# Patient Record
Sex: MALE | Race: AMERICAN INDIAN OR ALASKA NATIVE | NOT HISPANIC OR LATINO | Employment: OTHER | URBAN - NONMETROPOLITAN AREA
[De-identification: names, ages, dates, MRNs, and addresses within clinical notes are randomized per-mention and may not be internally consistent; named-entity substitution may affect disease eponyms.]

---

## 2021-08-06 ENCOUNTER — HOSPITAL ENCOUNTER (EMERGENCY)
Facility: HOSPITAL | Age: 51
Discharge: HOME | End: 2021-08-06
Payer: MEDICARE

## 2021-08-06 VITALS
SYSTOLIC BLOOD PRESSURE: 124 MMHG | BODY MASS INDEX: 32.58 KG/M2 | OXYGEN SATURATION: 96 % | WEIGHT: 220 LBS | RESPIRATION RATE: 20 BRPM | DIASTOLIC BLOOD PRESSURE: 54 MMHG | HEIGHT: 69 IN | TEMPERATURE: 101 F | HEART RATE: 71 BPM

## 2021-08-06 DIAGNOSIS — R50.9 FEVER, UNSPECIFIED FEVER CAUSE: ICD-10-CM

## 2021-08-06 DIAGNOSIS — B34.9 VIRAL SYNDROME: Primary | ICD-10-CM

## 2021-08-06 PROCEDURE — 99282 EMERGENCY DEPT VISIT SF MDM: CPT

## 2021-08-06 PROCEDURE — 99282 EMERGENCY DEPT VISIT SF MDM: CPT | Mod: GF | Performed by: NURSE PRACTITIONER

## 2021-08-06 PROCEDURE — 25000003 PHARM REV CODE 250: Performed by: NURSE PRACTITIONER

## 2021-08-06 RX ORDER — ACETAMINOPHEN 325 MG/1
650 TABLET ORAL
Status: COMPLETED | OUTPATIENT
Start: 2021-08-06 | End: 2021-08-06

## 2021-08-06 RX ORDER — ACETAMINOPHEN 500 MG
500 TABLET ORAL
Status: DISCONTINUED | OUTPATIENT
Start: 2021-08-06 | End: 2021-08-06

## 2021-08-06 RX ADMIN — ACETAMINOPHEN 650 MG: 325 TABLET, FILM COATED ORAL at 06:08

## 2023-02-27 PROCEDURE — 99223 1ST HOSP IP/OBS HIGH 75: CPT | Mod: ,,, | Performed by: SURGERY

## 2023-02-27 PROCEDURE — 99223 PR INITIAL HOSPITAL CARE,LEVL III: ICD-10-PCS | Mod: ,,, | Performed by: SURGERY

## 2023-03-01 ENCOUNTER — OUTSIDE PLACE OF SERVICE (OUTPATIENT)
Dept: ADMINISTRATIVE | Facility: HOSPITAL | Age: 53
End: 2023-03-01
Payer: MEDICARE

## 2024-01-01 ENCOUNTER — ANESTHESIA (OUTPATIENT)
Dept: LAB | Facility: HOSPITAL | Age: 54
End: 2024-01-01

## 2024-01-01 ENCOUNTER — ANESTHESIA EVENT (OUTPATIENT)
Dept: INTENSIVE CARE | Facility: HOSPITAL | Age: 54
End: 2024-01-01

## 2024-01-01 ENCOUNTER — HOSPITAL ENCOUNTER (INPATIENT)
Facility: HOSPITAL | Age: 54
LOS: 2 days | DRG: 871 | End: 2024-11-18
Attending: EMERGENCY MEDICINE | Admitting: INTERNAL MEDICINE
Payer: COMMERCIAL

## 2024-01-01 ENCOUNTER — ANESTHESIA (OUTPATIENT)
Dept: INTENSIVE CARE | Facility: HOSPITAL | Age: 54
End: 2024-01-01

## 2024-01-01 ENCOUNTER — ANESTHESIA EVENT (OUTPATIENT)
Dept: LAB | Facility: HOSPITAL | Age: 54
End: 2024-01-01

## 2024-01-01 VITALS
DIASTOLIC BLOOD PRESSURE: 46 MMHG | BODY MASS INDEX: 28.63 KG/M2 | HEIGHT: 69 IN | SYSTOLIC BLOOD PRESSURE: 82 MMHG | WEIGHT: 193.31 LBS | RESPIRATION RATE: 68 BRPM | HEART RATE: 69 BPM | OXYGEN SATURATION: 93 % | TEMPERATURE: 100 F

## 2024-01-01 DIAGNOSIS — I21.A1 TYPE 2 MI (MYOCARDIAL INFARCTION): ICD-10-CM

## 2024-01-01 DIAGNOSIS — A41.9 SEVERE SEPSIS: Primary | ICD-10-CM

## 2024-01-01 DIAGNOSIS — I95.9 HYPOTENSION, UNSPECIFIED HYPOTENSION TYPE: ICD-10-CM

## 2024-01-01 DIAGNOSIS — R07.9 CHEST PAIN: ICD-10-CM

## 2024-01-01 DIAGNOSIS — R07.2 PRECORDIAL PAIN: ICD-10-CM

## 2024-01-01 DIAGNOSIS — I95.2 HYPOTENSION DUE TO DRUGS: ICD-10-CM

## 2024-01-01 DIAGNOSIS — J96.01 ACUTE RESPIRATORY FAILURE WITH HYPOXIA: ICD-10-CM

## 2024-01-01 DIAGNOSIS — E87.1 HYPONATREMIA: ICD-10-CM

## 2024-01-01 DIAGNOSIS — R79.89 ELEVATED TROPONIN: ICD-10-CM

## 2024-01-01 DIAGNOSIS — N18.9 CHRONIC RENAL FAILURE, UNSPECIFIED CKD STAGE: ICD-10-CM

## 2024-01-01 DIAGNOSIS — J90 PLEURAL EFFUSION ON RIGHT: ICD-10-CM

## 2024-01-01 DIAGNOSIS — N18.6 ESRD (END STAGE RENAL DISEASE): ICD-10-CM

## 2024-01-01 DIAGNOSIS — G93.41 ACUTE METABOLIC ENCEPHALOPATHY: ICD-10-CM

## 2024-01-01 DIAGNOSIS — N17.9 ARF (ACUTE RENAL FAILURE): ICD-10-CM

## 2024-01-01 DIAGNOSIS — R65.10 SIRS (SYSTEMIC INFLAMMATORY RESPONSE SYNDROME): ICD-10-CM

## 2024-01-01 DIAGNOSIS — E87.70 VOLUME OVERLOAD: ICD-10-CM

## 2024-01-01 DIAGNOSIS — R78.81 BACTEREMIA: ICD-10-CM

## 2024-01-01 DIAGNOSIS — E87.70 HYPERVOLEMIA, UNSPECIFIED HYPERVOLEMIA TYPE: ICD-10-CM

## 2024-01-01 DIAGNOSIS — R65.20 SEVERE SEPSIS: Primary | ICD-10-CM

## 2024-01-01 LAB
ALBUMIN FLD-MCNC: 1.8 G/DL
ALBUMIN SERPL BCP-MCNC: 2.4 G/DL (ref 3.5–5)
ALBUMIN SERPL BCP-MCNC: 2.4 G/DL (ref 3.5–5)
ALBUMIN/GLOB SERPL: 0.6 {RATIO}
ALP SERPL-CCNC: 120 U/L (ref 40–150)
ALP SERPL-CCNC: 139 U/L (ref 40–150)
ALT SERPL W P-5'-P-CCNC: 14 U/L (ref 0–55)
ALT SERPL W P-5'-P-CCNC: 22 U/L (ref 0–55)
AMMONIA PLAS-SCNC: 29 ΜMOL/L (ref 18–72)
ANION GAP SERPL CALCULATED.3IONS-SCNC: 15 MMOL/L (ref 7–16)
ANION GAP SERPL CALCULATED.3IONS-SCNC: 18 MMOL/L (ref 7–16)
ANION GAP SERPL CALCULATED.3IONS-SCNC: 19 MMOL/L (ref 7–16)
ANION GAP SERPL CALCULATED.3IONS-SCNC: 20 MMOL/L (ref 7–16)
ANION GAP SERPL CALCULATED.3IONS-SCNC: 21 MMOL/L (ref 7–16)
ANISOCYTOSIS BLD QL SMEAR: ABNORMAL
ANISOCYTOSIS BLD QL SMEAR: ABNORMAL
AORTIC ROOT ANNULUS: 3.11 CM
AORTIC VALVE CUSP SEPERATION: 1.98 CM
AST SERPL W P-5'-P-CCNC: 39 U/L (ref 5–34)
AST SERPL W P-5'-P-CCNC: 41 U/L (ref 5–34)
AV INDEX (PROSTH): 0.4
AV MEAN GRADIENT: 11.4 MMHG
AV PEAK GRADIENT: 19.4 MMHG
AV VALVE AREA BY VELOCITY RATIO: 1.8 CM²
AV VALVE AREA: 1.8 CM²
AV VELOCITY RATIO: 0.41
BASOPHILS # BLD AUTO: 0.03 K/UL (ref 0–0.2)
BASOPHILS # BLD AUTO: 0.06 K/UL (ref 0–0.2)
BASOPHILS # BLD AUTO: 0.07 K/UL (ref 0–0.2)
BASOPHILS # BLD AUTO: 0.1 K/UL (ref 0–0.2)
BASOPHILS # BLD AUTO: 0.12 K/UL (ref 0–0.2)
BASOPHILS NFR BLD AUTO: 0.2 % (ref 0–1)
BASOPHILS NFR BLD AUTO: 0.4 % (ref 0–1)
BASOPHILS NFR BLD AUTO: 0.5 % (ref 0–1)
BASOPHILS NFR BLD AUTO: 0.5 % (ref 0–1)
BASOPHILS NFR BLD AUTO: 0.7 % (ref 0–1)
BILIRUB DIRECT SERPL-MCNC: 1 MG/DL (ref 0–?)
BILIRUB SERPL-MCNC: 1.2 MG/DL
BILIRUB SERPL-MCNC: 1.7 MG/DL
BSA FOR ECHO PROCEDURE: 2.02 M2
BUN SERPL-MCNC: 49 MG/DL (ref 8–26)
BUN SERPL-MCNC: 63 MG/DL (ref 8–26)
BUN SERPL-MCNC: 63 MG/DL (ref 8–26)
BUN SERPL-MCNC: 69 MG/DL (ref 8–26)
BUN SERPL-MCNC: 77 MG/DL (ref 8–26)
BUN/CREAT SERPL: 10 (ref 6–20)
BUN/CREAT SERPL: 10 (ref 6–20)
BUN/CREAT SERPL: 11 (ref 6–20)
BUN/CREAT SERPL: 11 (ref 6–20)
BUN/CREAT SERPL: 9 (ref 6–20)
CALCIUM SERPL-MCNC: 10.4 MG/DL (ref 8.4–10.2)
CALCIUM SERPL-MCNC: 7.8 MG/DL (ref 8.4–10.2)
CALCIUM SERPL-MCNC: 7.9 MG/DL (ref 8.4–10.2)
CALCIUM SERPL-MCNC: 8 MG/DL (ref 8.4–10.2)
CALCIUM SERPL-MCNC: 8 MG/DL (ref 8.4–10.2)
CHLORIDE SERPL-SCNC: 87 MMOL/L (ref 98–107)
CHLORIDE SERPL-SCNC: 87 MMOL/L (ref 98–107)
CHLORIDE SERPL-SCNC: 89 MMOL/L (ref 98–107)
CHLORIDE SERPL-SCNC: 90 MMOL/L (ref 98–107)
CHLORIDE SERPL-SCNC: 90 MMOL/L (ref 98–107)
CHOLEST FLD-MCNC: 36 MG/DL
CLARITY FLD: ABNORMAL
CO2 SERPL-SCNC: 20 MMOL/L (ref 22–29)
CO2 SERPL-SCNC: 22 MMOL/L (ref 22–29)
CO2 SERPL-SCNC: 22 MMOL/L (ref 22–29)
CO2 SERPL-SCNC: 23 MMOL/L (ref 22–29)
CO2 SERPL-SCNC: 25 MMOL/L (ref 22–29)
COLOR FLD: ABNORMAL
CORTIS SERPL-MCNC: 14.5 ΜG/DL
CREAT SERPL-MCNC: 5.09 MG/DL (ref 0.72–1.25)
CREAT SERPL-MCNC: 5.84 MG/DL (ref 0.72–1.25)
CREAT SERPL-MCNC: 6.28 MG/DL (ref 0.72–1.25)
CREAT SERPL-MCNC: 6.85 MG/DL (ref 0.72–1.25)
CREAT SERPL-MCNC: 7.38 MG/DL (ref 0.72–1.25)
CV ECHO LV RWT: 0.59 CM
DIFFERENTIAL METHOD BLD: ABNORMAL
DOP CALC AO PEAK VEL: 2.2 M/S
DOP CALC AO VTI: 42.5 CM
DOP CALC LVOT AREA: 4.5 CM2
DOP CALC LVOT DIAMETER: 2.4 CM
DOP CALC LVOT PEAK VEL: 0.9 M/S
DOP CALC LVOT STROKE VOLUME: 76.4 CM3
DOP CALCLVOT PEAK VEL VTI: 16.9 CM
ECHO LV POSTERIOR WALL: 1.1 CM (ref 0.6–1.1)
EGFR (NO RACE VARIABLE) (RUSH/TITUS): 10 ML/MIN/1.73M2
EGFR (NO RACE VARIABLE) (RUSH/TITUS): 11 ML/MIN/1.73M2
EGFR (NO RACE VARIABLE) (RUSH/TITUS): 13 ML/MIN/1.73M2
EGFR (NO RACE VARIABLE) (RUSH/TITUS): 8 ML/MIN/1.73M2
EGFR (NO RACE VARIABLE) (RUSH/TITUS): 9 ML/MIN/1.73M2
EOSINOPHIL # BLD AUTO: 0.01 K/UL (ref 0–0.5)
EOSINOPHIL # BLD AUTO: 0.01 K/UL (ref 0–0.5)
EOSINOPHIL # BLD AUTO: 0.05 K/UL (ref 0–0.5)
EOSINOPHIL # BLD AUTO: 0.07 K/UL (ref 0–0.5)
EOSINOPHIL # BLD AUTO: 0.1 K/UL (ref 0–0.5)
EOSINOPHIL NFR BLD AUTO: 0.1 % (ref 1–4)
EOSINOPHIL NFR BLD AUTO: 0.1 % (ref 1–4)
EOSINOPHIL NFR BLD AUTO: 0.3 % (ref 1–4)
EOSINOPHIL NFR BLD AUTO: 0.4 % (ref 1–4)
EOSINOPHIL NFR BLD AUTO: 0.8 % (ref 1–4)
ERYTHROCYTE [DISTWIDTH] IN BLOOD BY AUTOMATED COUNT: 14.5 % (ref 11.5–14.5)
ERYTHROCYTE [DISTWIDTH] IN BLOOD BY AUTOMATED COUNT: 14.6 % (ref 11.5–14.5)
ERYTHROCYTE [DISTWIDTH] IN BLOOD BY AUTOMATED COUNT: 14.6 % (ref 11.5–14.5)
ERYTHROCYTE [DISTWIDTH] IN BLOOD BY AUTOMATED COUNT: 14.7 % (ref 11.5–14.5)
ERYTHROCYTE [DISTWIDTH] IN BLOOD BY AUTOMATED COUNT: 14.8 % (ref 11.5–14.5)
EST. AVERAGE GLUCOSE BLD GHB EST-MCNC: 126 MG/DL
FOLATE SERPL-MCNC: 5.8 NG/ML (ref 7–31.4)
FRACTIONAL SHORTENING: 16.2 % (ref 28–44)
GLOBULIN SER-MCNC: 4 G/DL (ref 2–4)
GLUCOSE FLD-MCNC: 121 MG/DL
GLUCOSE SERPL-MCNC: 170 MG/DL (ref 74–100)
GLUCOSE SERPL-MCNC: 194 MG/DL (ref 70–105)
GLUCOSE SERPL-MCNC: 197 MG/DL (ref 70–105)
GLUCOSE SERPL-MCNC: 219 MG/DL (ref 74–100)
GLUCOSE SERPL-MCNC: 229 MG/DL (ref 74–100)
GLUCOSE SERPL-MCNC: 238 MG/DL (ref 70–105)
GLUCOSE SERPL-MCNC: 243 MG/DL (ref 70–105)
GLUCOSE SERPL-MCNC: 250 MG/DL (ref 70–105)
GLUCOSE SERPL-MCNC: 257 MG/DL (ref 74–100)
GLUCOSE SERPL-MCNC: 261 MG/DL (ref 74–100)
GLUCOSE SERPL-MCNC: 266 MG/DL (ref 70–105)
GLUCOSE SERPL-MCNC: 277 MG/DL (ref 70–105)
GLUCOSE SERPL-MCNC: 281 MG/DL (ref 70–105)
GRANULOCYTES NFR FLD MANUAL: 73 % (ref 0–25)
HBA1C MFR BLD HPLC: 6 %
HCO3 UR-SCNC: 25.5 MMOL/L (ref 21–28)
HCO3 UR-SCNC: 27.2 MMOL/L (ref 21–28)
HCT VFR BLD AUTO: 26.5 % (ref 40–54)
HCT VFR BLD AUTO: 27.8 % (ref 40–54)
HCT VFR BLD AUTO: 28.1 % (ref 40–54)
HCT VFR BLD AUTO: 28.7 % (ref 40–54)
HCT VFR BLD AUTO: 29.8 % (ref 40–54)
HGB BLD-MCNC: 8.6 G/DL (ref 13.5–18)
HGB BLD-MCNC: 9.2 G/DL (ref 13.5–18)
HGB BLD-MCNC: 9.2 G/DL (ref 13.5–18)
HGB BLD-MCNC: 9.5 G/DL (ref 13.5–18)
HGB BLD-MCNC: 9.8 G/DL (ref 13.5–18)
HYPOCHROMIA BLD QL SMEAR: ABNORMAL
IMM GRANULOCYTES # BLD AUTO: 0.06 K/UL (ref 0–0.04)
IMM GRANULOCYTES # BLD AUTO: 0.21 K/UL (ref 0–0.04)
IMM GRANULOCYTES # BLD AUTO: 0.71 K/UL (ref 0–0.04)
IMM GRANULOCYTES # BLD AUTO: 0.95 K/UL (ref 0–0.04)
IMM GRANULOCYTES # BLD AUTO: 1.88 K/UL (ref 0–0.04)
IMM GRANULOCYTES NFR BLD: 0.5 % (ref 0–0.4)
IMM GRANULOCYTES NFR BLD: 1.6 % (ref 0–0.4)
IMM GRANULOCYTES NFR BLD: 11.9 % (ref 0–0.4)
IMM GRANULOCYTES NFR BLD: 4.4 % (ref 0–0.4)
IMM GRANULOCYTES NFR BLD: 5.1 % (ref 0–0.4)
INTERVENTRICULAR SEPTUM: 1.4 CM (ref 0.6–1.1)
IVC DIAMETER: 1.99 CM
LACTATE SERPL-SCNC: 0.7 MMOL/L (ref 0.5–2.2)
LACTATE SERPL-SCNC: 1.1 MMOL/L (ref 0.5–2.2)
LACTATE SERPL-SCNC: 1.6 MMOL/L (ref 0.5–2.2)
LDH FLD-CCNC: 667 U/L
LEFT ATRIUM SIZE: 4.4 CM
LEFT INTERNAL DIMENSION IN SYSTOLE: 3.1 CM (ref 2.1–4)
LEFT VENTRICLE DIASTOLIC VOLUME INDEX: 28.84 ML/M2
LEFT VENTRICLE DIASTOLIC VOLUME: 58.83 ML
LEFT VENTRICLE MASS INDEX: 76.8 G/M2
LEFT VENTRICLE SYSTOLIC VOLUME INDEX: 19 ML/M2
LEFT VENTRICLE SYSTOLIC VOLUME: 38.72 ML
LEFT VENTRICULAR INTERNAL DIMENSION IN DIASTOLE: 3.7 CM (ref 3.5–6)
LEFT VENTRICULAR MASS: 156.7 G
LVED V (TEICH): 58.83 ML
LVES V (TEICH): 38.72 ML
LVOT MG: 1.56 MMHG
LVOT MV: 0.59 CM/S
LYMPHOCYTES # BLD AUTO: 0.28 K/UL (ref 1–4.8)
LYMPHOCYTES # BLD AUTO: 0.29 K/UL (ref 1–4.8)
LYMPHOCYTES # BLD AUTO: 0.54 K/UL (ref 1–4.8)
LYMPHOCYTES # BLD AUTO: 0.72 K/UL (ref 1–4.8)
LYMPHOCYTES # BLD AUTO: 2.12 K/UL (ref 1–4.8)
LYMPHOCYTES NFR BLD AUTO: 1.8 % (ref 27–41)
LYMPHOCYTES NFR BLD AUTO: 11.3 % (ref 27–41)
LYMPHOCYTES NFR BLD AUTO: 2.2 % (ref 27–41)
LYMPHOCYTES NFR BLD AUTO: 4.3 % (ref 27–41)
LYMPHOCYTES NFR BLD AUTO: 4.4 % (ref 27–41)
LYMPHOCYTES NFR BLD MANUAL: 2 % (ref 27–41)
LYMPHOCYTES NFR BLD MANUAL: 5 % (ref 27–41)
LYMPHOCYTES NFR BLD MANUAL: 8 % (ref 27–41)
LYMPHOCYTES NFR FLD MANUAL: 9 %
MAGNESIUM SERPL-MCNC: 2.2 MG/DL (ref 1.6–2.6)
MAGNESIUM SERPL-MCNC: 2.2 MG/DL (ref 1.6–2.6)
MAGNESIUM SERPL-MCNC: 2.3 MG/DL (ref 1.6–2.6)
MCH RBC QN AUTO: 32.3 PG (ref 27–31)
MCH RBC QN AUTO: 32.6 PG (ref 27–31)
MCH RBC QN AUTO: 32.9 PG (ref 27–31)
MCH RBC QN AUTO: 33.1 PG (ref 27–31)
MCH RBC QN AUTO: 33.2 PG (ref 27–31)
MCHC RBC AUTO-ENTMCNC: 32.5 G/DL (ref 32–36)
MCHC RBC AUTO-ENTMCNC: 32.7 G/DL (ref 32–36)
MCHC RBC AUTO-ENTMCNC: 32.9 G/DL (ref 32–36)
MCHC RBC AUTO-ENTMCNC: 33.1 G/DL (ref 32–36)
MCHC RBC AUTO-ENTMCNC: 33.1 G/DL (ref 32–36)
MCV RBC AUTO: 101.1 FL (ref 80–96)
MCV RBC AUTO: 102.3 FL (ref 80–96)
MCV RBC AUTO: 98.3 FL (ref 80–96)
MCV RBC AUTO: 98.6 FL (ref 80–96)
MCV RBC AUTO: 99.3 FL (ref 80–96)
METAMYELOCYTES NFR BLD MANUAL: 2 %
MONOCYTES # BLD AUTO: 0.48 K/UL (ref 0–0.8)
MONOCYTES # BLD AUTO: 0.85 K/UL (ref 0–0.8)
MONOCYTES # BLD AUTO: 1.13 K/UL (ref 0–0.8)
MONOCYTES # BLD AUTO: 1.42 K/UL (ref 0–0.8)
MONOCYTES # BLD AUTO: 1.43 K/UL (ref 0–0.8)
MONOCYTES NFR BLD AUTO: 3.7 % (ref 2–6)
MONOCYTES NFR BLD AUTO: 5.4 % (ref 2–6)
MONOCYTES NFR BLD AUTO: 7.6 % (ref 2–6)
MONOCYTES NFR BLD AUTO: 8.7 % (ref 2–6)
MONOCYTES NFR BLD AUTO: 9 % (ref 2–6)
MONOCYTES NFR BLD MANUAL: 1 % (ref 2–6)
MONOCYTES NFR BLD MANUAL: 2 % (ref 2–6)
MONOCYTES NFR BLD MANUAL: 5 % (ref 2–6)
MONOCYTES NFR FLD MANUAL: 18 %
MPC BLD CALC-MCNC: 11 FL (ref 9.4–12.4)
MPC BLD CALC-MCNC: 11.1 FL (ref 9.4–12.4)
MPC BLD CALC-MCNC: 11.9 FL (ref 9.4–12.4)
MPC BLD CALC-MCNC: 12 FL (ref 9.4–12.4)
MPC BLD CALC-MCNC: 13.2 FL (ref 9.4–12.4)
NEUTROPHILS # BLD AUTO: 10.79 K/UL (ref 1.8–7.7)
NEUTROPHILS # BLD AUTO: 11.78 K/UL (ref 1.8–7.7)
NEUTROPHILS # BLD AUTO: 12.62 K/UL (ref 1.8–7.7)
NEUTROPHILS # BLD AUTO: 13.29 K/UL (ref 1.8–7.7)
NEUTROPHILS # BLD AUTO: 14.12 K/UL (ref 1.8–7.7)
NEUTROPHILS NFR BLD AUTO: 75.2 % (ref 53–65)
NEUTROPHILS NFR BLD AUTO: 80.1 % (ref 53–65)
NEUTROPHILS NFR BLD AUTO: 81.7 % (ref 53–65)
NEUTROPHILS NFR BLD AUTO: 85.9 % (ref 53–65)
NEUTROPHILS NFR BLD AUTO: 91.2 % (ref 53–65)
NEUTS BAND NFR BLD MANUAL: 20 % (ref 1–5)
NEUTS BAND NFR BLD MANUAL: 4 % (ref 1–5)
NEUTS BAND NFR BLD MANUAL: 8 % (ref 1–5)
NEUTS SEG NFR BLD MANUAL: 68 % (ref 50–62)
NEUTS SEG NFR BLD MANUAL: 86 % (ref 50–62)
NEUTS SEG NFR BLD MANUAL: 89 % (ref 50–62)
NRBC # BLD AUTO: 0 X10E3/UL
NRBC # BLD AUTO: 0.02 X10E3/UL
NRBC, AUTO (.00): 0 %
NRBC, AUTO (.00): 0.1 %
NT-PROBNP SERPL-MCNC: ABNORMAL PG/ML (ref 1–125)
NT-PROBNP SERPL-MCNC: ABNORMAL PG/ML (ref 1–125)
OHS CV RV/LV RATIO: 1.27 CM
OVALOCYTES BLD QL SMEAR: ABNORMAL
PCO2 BLDA: 35 MMHG (ref 35–48)
PCO2 BLDA: 40 MMHG (ref 35–48)
PH SMN: 7.44 [PH] (ref 7.35–7.45)
PH SMN: 7.47 [PH] (ref 7.35–7.45)
PISA TR MAX VEL: 3.49 M/S
PLATELET # BLD AUTO: 114 K/UL (ref 150–400)
PLATELET # BLD AUTO: 120 K/UL (ref 150–400)
PLATELET # BLD AUTO: 123 K/UL (ref 150–400)
PLATELET # BLD AUTO: 66 K/UL (ref 150–400)
PLATELET # BLD AUTO: 75 K/UL (ref 150–400)
PLATELET MORPHOLOGY: ABNORMAL
PLATELET MORPHOLOGY: ABNORMAL
PLATELET MORPHOLOGY: NORMAL
PO2 BLDA: 32 MMHG (ref 83–108)
PO2 BLDA: 62 MMHG (ref 83–108)
POC BASE EXCESS: 2 MMOL/L (ref -2–3)
POC BASE EXCESS: 2.8 MMOL/L (ref -2–3)
POC OCCULT BLOOD STOOL: NEGATIVE
POC SATURATED O2: 65 % (ref 95–98)
POC SATURATED O2: 93 % (ref 95–98)
POLYCHROMASIA BLD QL SMEAR: ABNORMAL
POTASSIUM SERPL-SCNC: 4.4 MMOL/L (ref 3.5–5.1)
POTASSIUM SERPL-SCNC: 4.4 MMOL/L (ref 3.5–5.1)
POTASSIUM SERPL-SCNC: 4.5 MMOL/L (ref 3.5–5.1)
POTASSIUM SERPL-SCNC: 4.5 MMOL/L (ref 3.5–5.1)
POTASSIUM SERPL-SCNC: 4.6 MMOL/L (ref 3.5–5.1)
PROT FLD-MCNC: 3.9 G/DL
PROT SERPL-MCNC: 6.2 G/DL (ref 6.4–8.3)
PROT SERPL-MCNC: 6.4 G/DL (ref 6.4–8.3)
RA VOL SYS: 130.41 ML
RBC # BLD AUTO: 2.59 M/UL (ref 4.6–6.2)
RBC # BLD AUTO: 2.78 M/UL (ref 4.6–6.2)
RBC # BLD AUTO: 2.82 M/UL (ref 4.6–6.2)
RBC # BLD AUTO: 2.89 M/UL (ref 4.6–6.2)
RBC # BLD AUTO: 3.03 M/UL (ref 4.6–6.2)
RBC # FLD MANUAL: ABNORMAL /CUMM
RIGHT ATRIAL AREA: 32.5 CM2
RIGHT ATRIUM VOLUME AREA LENGTH APICAL 4 CHAMBER: 125.1 ML
RIGHT VENTRICLE DIASTOLIC BASEL DIMENSION: 4.7 CM
RIGHT VENTRICLE DIASTOLIC LENGTH: 10.4 CM
RIGHT VENTRICULAR END-DIASTOLIC DIMENSION: 4.87 CM
RIGHT VENTRICULAR LENGTH IN DIASTOLE (APICAL 4-CHAMBER VIEW): 10.43 CM
SODIUM SERPL-SCNC: 122 MMOL/L (ref 136–145)
SODIUM SERPL-SCNC: 123 MMOL/L (ref 136–145)
SODIUM SERPL-SCNC: 125 MMOL/L (ref 136–145)
SODIUM SERPL-SCNC: 126 MMOL/L (ref 136–145)
SODIUM SERPL-SCNC: 129 MMOL/L (ref 136–145)
TR MAX PG: 49 MMHG
TRIGL SERPL-MCNC: 23 MG/DL
TROPONIN I SERPL HS-MCNC: 133.9 NG/L
TROPONIN I SERPL HS-MCNC: 142.5 NG/L
TROPONIN I SERPL HS-MCNC: 158.8 NG/L
TROPONIN I SERPL HS-MCNC: 67.4 NG/L
TROPONIN I SERPL HS-MCNC: 80.2 NG/L
TROPONIN I SERPL HS-MCNC: 86.8 NG/L
TSH SERPL DL<=0.005 MIU/L-ACNC: 3.73 UIU/ML (ref 0.35–4.94)
VERIGENE RESULT: ABNORMAL
VIT B12 SERPL-MCNC: >2000 PG/ML (ref 213–816)
WBC # BLD AUTO: 12.56 K/UL (ref 4.5–11)
WBC # BLD AUTO: 12.91 K/UL (ref 4.5–11)
WBC # BLD AUTO: 15.78 K/UL (ref 4.5–11)
WBC # BLD AUTO: 16.27 K/UL (ref 4.5–11)
WBC # BLD AUTO: 18.77 K/UL (ref 4.5–11)
WBC # FLD MANUAL: ABNORMAL /CUMM
Z-SCORE OF LEFT VENTRICULAR DIMENSION IN END DIASTOLE: -4.97
Z-SCORE OF LEFT VENTRICULAR DIMENSION IN END SYSTOLE: -1.46

## 2024-01-01 PROCEDURE — 27000221 HC OXYGEN, UP TO 24 HOURS

## 2024-01-01 PROCEDURE — 94660 CPAP INITIATION&MGMT: CPT

## 2024-01-01 PROCEDURE — 5A1935Z RESPIRATORY VENTILATION, LESS THAN 24 CONSECUTIVE HOURS: ICD-10-PCS | Performed by: INTERNAL MEDICINE

## 2024-01-01 PROCEDURE — 99900026 HC AIRWAY MAINTENANCE (STAT)

## 2024-01-01 PROCEDURE — 93005 ELECTROCARDIOGRAM TRACING: CPT

## 2024-01-01 PROCEDURE — 94640 AIRWAY INHALATION TREATMENT: CPT | Mod: XB

## 2024-01-01 PROCEDURE — 25000003 PHARM REV CODE 250: Performed by: INTERNAL MEDICINE

## 2024-01-01 PROCEDURE — 82272 OCCULT BLD FECES 1-3 TESTS: CPT

## 2024-01-01 PROCEDURE — C1729 CATH, DRAINAGE: HCPCS

## 2024-01-01 PROCEDURE — 99900035 HC TECH TIME PER 15 MIN (STAT)

## 2024-01-01 PROCEDURE — 94640 AIRWAY INHALATION TREATMENT: CPT

## 2024-01-01 PROCEDURE — 99291 CRITICAL CARE FIRST HOUR: CPT | Mod: 25,,, | Performed by: STUDENT IN AN ORGANIZED HEALTH CARE EDUCATION/TRAINING PROGRAM

## 2024-01-01 PROCEDURE — 90935 HEMODIALYSIS ONE EVALUATION: CPT

## 2024-01-01 PROCEDURE — 85025 COMPLETE CBC W/AUTO DIFF WBC: CPT | Performed by: INTERNAL MEDICINE

## 2024-01-01 PROCEDURE — 63600175 PHARM REV CODE 636 W HCPCS: Performed by: INTERNAL MEDICINE

## 2024-01-01 PROCEDURE — 25000003 PHARM REV CODE 250: Performed by: STUDENT IN AN ORGANIZED HEALTH CARE EDUCATION/TRAINING PROGRAM

## 2024-01-01 PROCEDURE — 80048 BASIC METABOLIC PNL TOTAL CA: CPT | Performed by: INTERNAL MEDICINE

## 2024-01-01 PROCEDURE — 82962 GLUCOSE BLOOD TEST: CPT

## 2024-01-01 PROCEDURE — 32551 INSERTION OF CHEST TUBE: CPT

## 2024-01-01 PROCEDURE — 99900017 HC EXTUBATION W/PARAMETERS (STAT)

## 2024-01-01 PROCEDURE — 0BH18EZ INSERTION OF ENDOTRACHEAL AIRWAY INTO TRACHEA, VIA NATURAL OR ARTIFICIAL OPENING ENDOSCOPIC: ICD-10-PCS | Performed by: INTERNAL MEDICINE

## 2024-01-01 PROCEDURE — 36415 COLL VENOUS BLD VENIPUNCTURE: CPT | Performed by: EMERGENCY MEDICINE

## 2024-01-01 PROCEDURE — 5A09357 ASSISTANCE WITH RESPIRATORY VENTILATION, LESS THAN 24 CONSECUTIVE HOURS, CONTINUOUS POSITIVE AIRWAY PRESSURE: ICD-10-PCS | Performed by: INTERNAL MEDICINE

## 2024-01-01 PROCEDURE — 27000190 HC CPAP FULL FACE MASK W/VALVE

## 2024-01-01 PROCEDURE — 84484 ASSAY OF TROPONIN QUANT: CPT | Performed by: INTERNAL MEDICINE

## 2024-01-01 PROCEDURE — G0378 HOSPITAL OBSERVATION PER HR: HCPCS

## 2024-01-01 PROCEDURE — 25000242 PHARM REV CODE 250 ALT 637 W/ HCPCS: Performed by: INTERNAL MEDICINE

## 2024-01-01 PROCEDURE — 84484 ASSAY OF TROPONIN QUANT: CPT | Mod: 91 | Performed by: INTERNAL MEDICINE

## 2024-01-01 PROCEDURE — 36415 COLL VENOUS BLD VENIPUNCTURE: CPT | Performed by: INTERNAL MEDICINE

## 2024-01-01 PROCEDURE — 25000242 PHARM REV CODE 250 ALT 637 W/ HCPCS: Performed by: EMERGENCY MEDICINE

## 2024-01-01 PROCEDURE — 63600175 PHARM REV CODE 636 W HCPCS: Performed by: HOSPITALIST

## 2024-01-01 PROCEDURE — 88305 TISSUE EXAM BY PATHOLOGIST: CPT | Mod: TC,MCY | Performed by: STUDENT IN AN ORGANIZED HEALTH CARE EDUCATION/TRAINING PROGRAM

## 2024-01-01 PROCEDURE — 36600 WITHDRAWAL OF ARTERIAL BLOOD: CPT

## 2024-01-01 PROCEDURE — 63600175 PHARM REV CODE 636 W HCPCS: Performed by: EMERGENCY MEDICINE

## 2024-01-01 PROCEDURE — 83735 ASSAY OF MAGNESIUM: CPT | Performed by: INTERNAL MEDICINE

## 2024-01-01 PROCEDURE — 93010 ELECTROCARDIOGRAM REPORT: CPT | Mod: ,,, | Performed by: INTERNAL MEDICINE

## 2024-01-01 PROCEDURE — 94799 UNLISTED PULMONARY SVC/PX: CPT

## 2024-01-01 PROCEDURE — 82803 BLOOD GASES ANY COMBINATION: CPT

## 2024-01-01 PROCEDURE — 84478 ASSAY OF TRIGLYCERIDES: CPT | Performed by: STUDENT IN AN ORGANIZED HEALTH CARE EDUCATION/TRAINING PROGRAM

## 2024-01-01 PROCEDURE — 83605 ASSAY OF LACTIC ACID: CPT | Performed by: INTERNAL MEDICINE

## 2024-01-01 PROCEDURE — 82140 ASSAY OF AMMONIA: CPT | Performed by: INTERNAL MEDICINE

## 2024-01-01 PROCEDURE — 94761 N-INVAS EAR/PLS OXIMETRY MLT: CPT

## 2024-01-01 PROCEDURE — 80076 HEPATIC FUNCTION PANEL: CPT | Performed by: STUDENT IN AN ORGANIZED HEALTH CARE EDUCATION/TRAINING PROGRAM

## 2024-01-01 PROCEDURE — 88112 CYTOPATH CELL ENHANCE TECH: CPT | Mod: 26,,, | Performed by: PATHOLOGY

## 2024-01-01 PROCEDURE — 31500 INSERT EMERGENCY AIRWAY: CPT

## 2024-01-01 PROCEDURE — 87149 DNA/RNA DIRECT PROBE: CPT | Performed by: INTERNAL MEDICINE

## 2024-01-01 PROCEDURE — 99285 EMERGENCY DEPT VISIT HI MDM: CPT | Mod: 25

## 2024-01-01 PROCEDURE — 84157 ASSAY OF PROTEIN OTHER: CPT | Performed by: STUDENT IN AN ORGANIZED HEALTH CARE EDUCATION/TRAINING PROGRAM

## 2024-01-01 PROCEDURE — 5A1D70Z PERFORMANCE OF URINARY FILTRATION, INTERMITTENT, LESS THAN 6 HOURS PER DAY: ICD-10-PCS | Performed by: INTERNAL MEDICINE

## 2024-01-01 PROCEDURE — 82607 VITAMIN B-12: CPT | Performed by: INTERNAL MEDICINE

## 2024-01-01 PROCEDURE — 87040 BLOOD CULTURE FOR BACTERIA: CPT | Performed by: INTERNAL MEDICINE

## 2024-01-01 PROCEDURE — 83880 ASSAY OF NATRIURETIC PEPTIDE: CPT | Performed by: INTERNAL MEDICINE

## 2024-01-01 PROCEDURE — 84484 ASSAY OF TROPONIN QUANT: CPT | Performed by: STUDENT IN AN ORGANIZED HEALTH CARE EDUCATION/TRAINING PROGRAM

## 2024-01-01 PROCEDURE — 0W9930Z DRAINAGE OF RIGHT PLEURAL CAVITY WITH DRAINAGE DEVICE, PERCUTANEOUS APPROACH: ICD-10-PCS | Performed by: STUDENT IN AN ORGANIZED HEALTH CARE EDUCATION/TRAINING PROGRAM

## 2024-01-01 PROCEDURE — 5A0945A ASSISTANCE WITH RESPIRATORY VENTILATION, 24-96 CONSECUTIVE HOURS, HIGH NASAL FLOW/VELOCITY: ICD-10-PCS | Performed by: INTERNAL MEDICINE

## 2024-01-01 PROCEDURE — 88305 TISSUE EXAM BY PATHOLOGIST: CPT | Mod: 26,,, | Performed by: PATHOLOGY

## 2024-01-01 PROCEDURE — 25500020 PHARM REV CODE 255: Performed by: INTERNAL MEDICINE

## 2024-01-01 PROCEDURE — 63600175 PHARM REV CODE 636 W HCPCS: Performed by: STUDENT IN AN ORGANIZED HEALTH CARE EDUCATION/TRAINING PROGRAM

## 2024-01-01 PROCEDURE — 89050 BODY FLUID CELL COUNT: CPT | Performed by: STUDENT IN AN ORGANIZED HEALTH CARE EDUCATION/TRAINING PROGRAM

## 2024-01-01 PROCEDURE — 96372 THER/PROPH/DIAG INJ SC/IM: CPT | Performed by: INTERNAL MEDICINE

## 2024-01-01 PROCEDURE — 92950 HEART/LUNG RESUSCITATION CPR: CPT | Mod: ,,, | Performed by: INTERNAL MEDICINE

## 2024-01-01 PROCEDURE — 36415 COLL VENOUS BLD VENIPUNCTURE: CPT | Mod: 91 | Performed by: INTERNAL MEDICINE

## 2024-01-01 PROCEDURE — 84484 ASSAY OF TROPONIN QUANT: CPT | Performed by: EMERGENCY MEDICINE

## 2024-01-01 PROCEDURE — 5A12012 PERFORMANCE OF CARDIAC OUTPUT, SINGLE, MANUAL: ICD-10-PCS | Performed by: INTERNAL MEDICINE

## 2024-01-01 PROCEDURE — 83036 HEMOGLOBIN GLYCOSYLATED A1C: CPT | Performed by: HOSPITALIST

## 2024-01-01 PROCEDURE — 99223 1ST HOSP IP/OBS HIGH 75: CPT | Mod: ,,, | Performed by: INTERNAL MEDICINE

## 2024-01-01 PROCEDURE — 82042 OTHER SOURCE ALBUMIN QUAN EA: CPT | Performed by: STUDENT IN AN ORGANIZED HEALTH CARE EDUCATION/TRAINING PROGRAM

## 2024-01-01 PROCEDURE — 99233 SBSQ HOSP IP/OBS HIGH 50: CPT | Mod: 25,,, | Performed by: INTERNAL MEDICINE

## 2024-01-01 PROCEDURE — 96374 THER/PROPH/DIAG INJ IV PUSH: CPT

## 2024-01-01 PROCEDURE — 80053 COMPREHEN METABOLIC PANEL: CPT | Performed by: EMERGENCY MEDICINE

## 2024-01-01 PROCEDURE — 32556 INSERT CATH PLEURA W/O IMAGE: CPT | Mod: RT,,, | Performed by: STUDENT IN AN ORGANIZED HEALTH CARE EDUCATION/TRAINING PROGRAM

## 2024-01-01 PROCEDURE — 83880 ASSAY OF NATRIURETIC PEPTIDE: CPT | Performed by: EMERGENCY MEDICINE

## 2024-01-01 PROCEDURE — 85025 COMPLETE CBC W/AUTO DIFF WBC: CPT | Performed by: EMERGENCY MEDICINE

## 2024-01-01 PROCEDURE — 99900031 HC PATIENT EDUCATION (STAT)

## 2024-01-01 PROCEDURE — 99233 SBSQ HOSP IP/OBS HIGH 50: CPT | Mod: ,,, | Performed by: INTERNAL MEDICINE

## 2024-01-01 PROCEDURE — 11000001 HC ACUTE MED/SURG PRIVATE ROOM

## 2024-01-01 PROCEDURE — 20000000 HC ICU ROOM

## 2024-01-01 PROCEDURE — 93010 ELECTROCARDIOGRAM REPORT: CPT | Mod: XE,,, | Performed by: INTERNAL MEDICINE

## 2024-01-01 PROCEDURE — 82533 TOTAL CORTISOL: CPT | Performed by: STUDENT IN AN ORGANIZED HEALTH CARE EDUCATION/TRAINING PROGRAM

## 2024-01-01 PROCEDURE — 94002 VENT MGMT INPAT INIT DAY: CPT

## 2024-01-01 PROCEDURE — 87070 CULTURE OTHR SPECIMN AEROBIC: CPT | Performed by: STUDENT IN AN ORGANIZED HEALTH CARE EDUCATION/TRAINING PROGRAM

## 2024-01-01 PROCEDURE — 84443 ASSAY THYROID STIM HORMONE: CPT | Performed by: INTERNAL MEDICINE

## 2024-01-01 PROCEDURE — 92950 HEART/LUNG RESUSCITATION CPR: CPT

## 2024-01-01 RX ORDER — AMLODIPINE BESYLATE 2.5 MG/1
2.5 TABLET ORAL DAILY
Status: ON HOLD | COMMUNITY
Start: 2024-01-01 | End: 2024-01-01

## 2024-01-01 RX ORDER — CARVEDILOL 6.25 MG/1
6.25 TABLET ORAL 2 TIMES DAILY
Status: DISCONTINUED | OUTPATIENT
Start: 2024-01-01 | End: 2024-01-01

## 2024-01-01 RX ORDER — ONDANSETRON HYDROCHLORIDE 2 MG/ML
4 INJECTION, SOLUTION INTRAVENOUS EVERY 8 HOURS PRN
Status: DISCONTINUED | OUTPATIENT
Start: 2024-01-01 | End: 2024-01-01 | Stop reason: HOSPADM

## 2024-01-01 RX ORDER — ISOSORBIDE MONONITRATE 30 MG/1
60 TABLET, EXTENDED RELEASE ORAL EVERY MORNING
Status: DISCONTINUED | OUTPATIENT
Start: 2024-01-01 | End: 2024-01-01 | Stop reason: HOSPADM

## 2024-01-01 RX ORDER — SODIUM CHLORIDE 0.9 % (FLUSH) 0.9 %
10 SYRINGE (ML) INJECTION EVERY 12 HOURS PRN
Status: DISCONTINUED | OUTPATIENT
Start: 2024-01-01 | End: 2024-01-01 | Stop reason: HOSPADM

## 2024-01-01 RX ORDER — ATORVASTATIN CALCIUM 20 MG/1
20 TABLET, FILM COATED ORAL NIGHTLY
Status: DISCONTINUED | OUTPATIENT
Start: 2024-01-01 | End: 2024-01-01 | Stop reason: HOSPADM

## 2024-01-01 RX ORDER — MUPIROCIN 20 MG/G
OINTMENT TOPICAL 2 TIMES DAILY
Status: DISCONTINUED | OUTPATIENT
Start: 2024-01-01 | End: 2024-01-01 | Stop reason: HOSPADM

## 2024-01-01 RX ORDER — GLUCAGON 1 MG
1 KIT INJECTION
Status: DISCONTINUED | OUTPATIENT
Start: 2024-01-01 | End: 2024-01-01 | Stop reason: HOSPADM

## 2024-01-01 RX ORDER — NITROGLYCERIN 0.4 MG/1
0.4 TABLET SUBLINGUAL EVERY 5 MIN PRN
Status: DISCONTINUED | OUTPATIENT
Start: 2024-01-01 | End: 2024-01-01 | Stop reason: HOSPADM

## 2024-01-01 RX ORDER — NALOXONE HCL 0.4 MG/ML
0.02 VIAL (ML) INJECTION
Status: DISCONTINUED | OUTPATIENT
Start: 2024-01-01 | End: 2024-01-01 | Stop reason: HOSPADM

## 2024-01-01 RX ORDER — CALCIUM ACETATE 667 MG/1
1334 CAPSULE ORAL 3 TIMES DAILY
Status: DISCONTINUED | OUTPATIENT
Start: 2024-01-01 | End: 2024-01-01 | Stop reason: HOSPADM

## 2024-01-01 RX ORDER — ACETAMINOPHEN 325 MG/1
650 TABLET ORAL EVERY 4 HOURS PRN
Status: DISCONTINUED | OUTPATIENT
Start: 2024-01-01 | End: 2024-01-01 | Stop reason: HOSPADM

## 2024-01-01 RX ORDER — IPRATROPIUM BROMIDE AND ALBUTEROL SULFATE 2.5; .5 MG/3ML; MG/3ML
3 SOLUTION RESPIRATORY (INHALATION)
Status: COMPLETED | OUTPATIENT
Start: 2024-01-01 | End: 2024-01-01

## 2024-01-01 RX ORDER — LIDOCAINE HYDROCHLORIDE 20 MG/ML
15 SOLUTION OROPHARYNGEAL ONCE
Status: COMPLETED | OUTPATIENT
Start: 2024-01-01 | End: 2024-01-01

## 2024-01-01 RX ORDER — RANOLAZINE 500 MG/1
500 TABLET, EXTENDED RELEASE ORAL DAILY
Status: DISCONTINUED | OUTPATIENT
Start: 2024-01-01 | End: 2024-01-01 | Stop reason: HOSPADM

## 2024-01-01 RX ORDER — ASPIRIN 81 MG/1
1 TABLET ORAL EVERY MORNING
Status: ON HOLD | COMMUNITY
End: 2024-01-01

## 2024-01-01 RX ORDER — ALUMINUM HYDROXIDE, MAGNESIUM HYDROXIDE, AND SIMETHICONE 1200; 120; 1200 MG/30ML; MG/30ML; MG/30ML
30 SUSPENSION ORAL 4 TIMES DAILY PRN
Status: DISCONTINUED | OUTPATIENT
Start: 2024-01-01 | End: 2024-01-01 | Stop reason: HOSPADM

## 2024-01-01 RX ORDER — CALCIUM ACETATE 667 MG/1
1334 CAPSULE ORAL 3 TIMES DAILY
Status: ON HOLD | COMMUNITY
Start: 2024-01-01 | End: 2024-01-01

## 2024-01-01 RX ORDER — METOPROLOL SUCCINATE 25 MG/1
25 TABLET, EXTENDED RELEASE ORAL 2 TIMES DAILY
Status: DISCONTINUED | OUTPATIENT
Start: 2024-01-01 | End: 2024-01-01 | Stop reason: HOSPADM

## 2024-01-01 RX ORDER — IBUPROFEN 200 MG
16 TABLET ORAL
Status: DISCONTINUED | OUTPATIENT
Start: 2024-01-01 | End: 2024-01-01 | Stop reason: HOSPADM

## 2024-01-01 RX ORDER — SODIUM CHLORIDE 9 MG/ML
INJECTION, SOLUTION INTRAVENOUS
Status: CANCELLED | OUTPATIENT
Start: 2024-01-01

## 2024-01-01 RX ORDER — IPRATROPIUM BROMIDE AND ALBUTEROL SULFATE 2.5; .5 MG/3ML; MG/3ML
3 SOLUTION RESPIRATORY (INHALATION) EVERY 6 HOURS
Status: DISCONTINUED | OUTPATIENT
Start: 2024-01-01 | End: 2024-01-01 | Stop reason: HOSPADM

## 2024-01-01 RX ORDER — IOPAMIDOL 755 MG/ML
100 INJECTION, SOLUTION INTRAVASCULAR
Status: COMPLETED | OUTPATIENT
Start: 2024-01-01 | End: 2024-01-01

## 2024-01-01 RX ORDER — SODIUM CHLORIDE 0.9 % (FLUSH) 0.9 %
10 SYRINGE (ML) INJECTION
Status: DISCONTINUED | OUTPATIENT
Start: 2024-01-01 | End: 2024-01-01 | Stop reason: HOSPADM

## 2024-01-01 RX ORDER — LISINOPRIL 10 MG/1
10 TABLET ORAL 2 TIMES DAILY
Status: ON HOLD | COMMUNITY
Start: 2024-01-01 | End: 2024-01-01

## 2024-01-01 RX ORDER — ATROPINE SULFATE 0.1 MG/ML
INJECTION INTRAVENOUS
Status: DISCONTINUED
Start: 2024-01-01 | End: 2024-01-01 | Stop reason: HOSPADM

## 2024-01-01 RX ORDER — NOREPINEPHRINE BITARTRATE/D5W 4MG/250ML
0-.2 PLASTIC BAG, INJECTION (ML) INTRAVENOUS CONTINUOUS
Status: DISPENSED | OUTPATIENT
Start: 2024-01-01 | End: 2024-01-01

## 2024-01-01 RX ORDER — LATANOPROST 50 UG/ML
1 SOLUTION/ DROPS OPHTHALMIC NIGHTLY
Status: ON HOLD | COMMUNITY
Start: 2024-01-01 | End: 2024-01-01

## 2024-01-01 RX ORDER — SODIUM CHLORIDE 9 MG/ML
INJECTION, SOLUTION INTRAVENOUS
Status: DISPENSED
Start: 2024-01-01 | End: 2024-01-01

## 2024-01-01 RX ORDER — IBUPROFEN 200 MG
24 TABLET ORAL
Status: DISCONTINUED | OUTPATIENT
Start: 2024-01-01 | End: 2024-01-01 | Stop reason: HOSPADM

## 2024-01-01 RX ORDER — METHYLPREDNISOLONE SOD SUCC 125 MG
125 VIAL (EA) INJECTION
Status: COMPLETED | OUTPATIENT
Start: 2024-01-01 | End: 2024-01-01

## 2024-01-01 RX ORDER — ASPIRIN 81 MG/1
81 TABLET ORAL EVERY MORNING
Status: DISCONTINUED | OUTPATIENT
Start: 2024-01-01 | End: 2024-01-01 | Stop reason: HOSPADM

## 2024-01-01 RX ORDER — CARVEDILOL 12.5 MG/1
12.5 TABLET ORAL 2 TIMES DAILY
Status: ON HOLD | COMMUNITY
Start: 2024-01-01 | End: 2024-01-01

## 2024-01-01 RX ORDER — LATANOPROST 50 UG/ML
1 SOLUTION/ DROPS OPHTHALMIC NIGHTLY
Status: DISCONTINUED | OUTPATIENT
Start: 2024-01-01 | End: 2024-01-01 | Stop reason: HOSPADM

## 2024-01-01 RX ORDER — HEPARIN SODIUM 5000 [USP'U]/ML
5000 INJECTION, SOLUTION INTRAVENOUS; SUBCUTANEOUS EVERY 8 HOURS
Status: DISCONTINUED | OUTPATIENT
Start: 2024-01-01 | End: 2024-01-01 | Stop reason: HOSPADM

## 2024-01-01 RX ORDER — ALUMINUM HYDROXIDE, MAGNESIUM HYDROXIDE, AND SIMETHICONE 1200; 120; 1200 MG/30ML; MG/30ML; MG/30ML
30 SUSPENSION ORAL ONCE
Status: COMPLETED | OUTPATIENT
Start: 2024-01-01 | End: 2024-01-01

## 2024-01-01 RX ORDER — INSULIN ASPART 100 [IU]/ML
0-10 INJECTION, SOLUTION INTRAVENOUS; SUBCUTANEOUS
Status: DISCONTINUED | OUTPATIENT
Start: 2024-01-01 | End: 2024-01-01 | Stop reason: HOSPADM

## 2024-01-01 RX ORDER — HYDROCODONE BITARTRATE AND ACETAMINOPHEN 5; 325 MG/1; MG/1
1 TABLET ORAL EVERY 6 HOURS PRN
Status: DISCONTINUED | OUTPATIENT
Start: 2024-01-01 | End: 2024-01-01 | Stop reason: HOSPADM

## 2024-01-01 RX ORDER — CLOPIDOGREL BISULFATE 75 MG/1
75 TABLET ORAL DAILY
Status: DISCONTINUED | OUTPATIENT
Start: 2024-01-01 | End: 2024-01-01 | Stop reason: HOSPADM

## 2024-01-01 RX ADMIN — INSULIN ASPART 4 UNITS: 100 INJECTION, SOLUTION INTRAVENOUS; SUBCUTANEOUS at 06:11

## 2024-01-01 RX ADMIN — IPRATROPIUM BROMIDE AND ALBUTEROL SULFATE 3 ML: .5; 3 SOLUTION RESPIRATORY (INHALATION) at 07:11

## 2024-01-01 RX ADMIN — METHYLPREDNISOLONE SODIUM SUCCINATE 125 MG: 125 INJECTION, POWDER, FOR SOLUTION INTRAMUSCULAR; INTRAVENOUS at 12:11

## 2024-01-01 RX ADMIN — IPRATROPIUM BROMIDE AND ALBUTEROL SULFATE 3 ML: .5; 3 SOLUTION RESPIRATORY (INHALATION) at 11:11

## 2024-01-01 RX ADMIN — CARVEDILOL 6.25 MG: 6.25 TABLET, FILM COATED ORAL at 09:11

## 2024-01-01 RX ADMIN — CALCIUM ACETATE 1334 MG: 667 CAPSULE ORAL at 09:11

## 2024-01-01 RX ADMIN — IPRATROPIUM BROMIDE AND ALBUTEROL SULFATE 3 ML: .5; 3 SOLUTION RESPIRATORY (INHALATION) at 12:11

## 2024-01-01 RX ADMIN — CLOPIDOGREL BISULFATE 75 MG: 75 TABLET ORAL at 08:11

## 2024-01-01 RX ADMIN — LATANOPROST 1 DROP: 50 SOLUTION OPHTHALMIC at 09:11

## 2024-01-01 RX ADMIN — HEPARIN SODIUM 5000 UNITS: 5000 INJECTION, SOLUTION INTRAVENOUS; SUBCUTANEOUS at 09:11

## 2024-01-01 RX ADMIN — HYDROCODONE BITARTRATE AND ACETAMINOPHEN 1 TABLET: 5; 325 TABLET ORAL at 08:11

## 2024-01-01 RX ADMIN — ATORVASTATIN CALCIUM 20 MG: 20 TABLET, FILM COATED ORAL at 09:11

## 2024-01-01 RX ADMIN — HEPARIN SODIUM 5000 UNITS: 5000 INJECTION, SOLUTION INTRAVENOUS; SUBCUTANEOUS at 05:11

## 2024-01-01 RX ADMIN — PIPERACILLIN SODIUM AND TAZOBACTAM SODIUM 4.5 G: 4; .5 INJECTION, POWDER, LYOPHILIZED, FOR SOLUTION INTRAVENOUS at 05:11

## 2024-01-01 RX ADMIN — LIDOCAINE HYDROCHLORIDE 15 ML: 20 SOLUTION ORAL at 07:11

## 2024-01-01 RX ADMIN — RANOLAZINE 500 MG: 500 TABLET, FILM COATED, EXTENDED RELEASE ORAL at 08:11

## 2024-01-01 RX ADMIN — ASPIRIN 81 MG: 81 TABLET, COATED ORAL at 09:11

## 2024-01-01 RX ADMIN — IOPAMIDOL 100 ML: 755 INJECTION, SOLUTION INTRAVENOUS at 12:11

## 2024-01-01 RX ADMIN — CLOPIDOGREL BISULFATE 75 MG: 75 TABLET ORAL at 12:11

## 2024-01-01 RX ADMIN — INSULIN ASPART 2 UNITS: 100 INJECTION, SOLUTION INTRAVENOUS; SUBCUTANEOUS at 09:11

## 2024-01-01 RX ADMIN — NOREPINEPHRINE BITARTRATE 0.02 MCG/KG/MIN: 4 INJECTION, SOLUTION INTRAVENOUS at 06:11

## 2024-01-01 RX ADMIN — VANCOMYCIN HYDROCHLORIDE 1750 MG: 500 INJECTION, POWDER, LYOPHILIZED, FOR SOLUTION INTRAVENOUS at 06:11

## 2024-01-01 RX ADMIN — ASPIRIN 81 MG: 81 TABLET, COATED ORAL at 12:11

## 2024-01-01 RX ADMIN — ASPIRIN 81 MG: 81 TABLET, COATED ORAL at 06:11

## 2024-01-01 RX ADMIN — HEPARIN SODIUM 5000 UNITS: 5000 INJECTION, SOLUTION INTRAVENOUS; SUBCUTANEOUS at 06:11

## 2024-01-01 RX ADMIN — ISOSORBIDE MONONITRATE 60 MG: 60 TABLET, EXTENDED RELEASE ORAL at 12:11

## 2024-01-01 RX ADMIN — IPRATROPIUM BROMIDE AND ALBUTEROL SULFATE 3 ML: .5; 3 SOLUTION RESPIRATORY (INHALATION) at 01:11

## 2024-01-01 RX ADMIN — NOREPINEPHRINE BITARTRATE 0.14 MCG/KG/MIN: 4 INJECTION, SOLUTION INTRAVENOUS at 08:11

## 2024-01-01 RX ADMIN — INSULIN ASPART 3 UNITS: 100 INJECTION, SOLUTION INTRAVENOUS; SUBCUTANEOUS at 06:11

## 2024-01-01 RX ADMIN — MUPIROCIN: 20 OINTMENT TOPICAL at 09:11

## 2024-01-01 RX ADMIN — ALUMINUM HYDROXIDE, MAGNESIUM HYDROXIDE, AND SIMETHICONE 30 ML: 1200; 120; 1200 SUSPENSION ORAL at 07:11

## 2024-01-01 RX ADMIN — INSULIN ASPART 6 UNITS: 100 INJECTION, SOLUTION INTRAVENOUS; SUBCUTANEOUS at 11:11

## 2024-01-01 RX ADMIN — CALCIUM ACETATE 1334 MG: 667 CAPSULE ORAL at 08:11

## 2024-01-01 RX ADMIN — NOREPINEPHRINE BITARTRATE 0.16 MCG/KG/MIN: 4 INJECTION, SOLUTION INTRAVENOUS at 03:11

## 2024-01-01 RX ADMIN — ALUMINUM HYDROXIDE, MAGNESIUM HYDROXIDE, AND SIMETHICONE 30 ML: 200; 200; 20 SUSPENSION ORAL at 09:11

## 2024-01-01 RX ADMIN — CARVEDILOL 6.25 MG: 6.25 TABLET, FILM COATED ORAL at 12:11

## 2024-01-01 RX ADMIN — PIPERACILLIN SODIUM AND TAZOBACTAM SODIUM 4.5 G: 4; .5 INJECTION, POWDER, LYOPHILIZED, FOR SOLUTION INTRAVENOUS at 01:11

## 2024-01-01 RX ADMIN — INSULIN ASPART 2 UNITS: 100 INJECTION, SOLUTION INTRAVENOUS; SUBCUTANEOUS at 05:11

## 2024-01-01 RX ADMIN — HEPARIN SODIUM 5000 UNITS: 5000 INJECTION, SOLUTION INTRAVENOUS; SUBCUTANEOUS at 04:11

## 2024-01-01 RX ADMIN — ISOSORBIDE MONONITRATE 60 MG: 60 TABLET, EXTENDED RELEASE ORAL at 02:11

## 2024-01-01 RX ADMIN — RANOLAZINE 500 MG: 500 TABLET, FILM COATED, EXTENDED RELEASE ORAL at 02:11

## 2024-01-01 RX ADMIN — CLOPIDOGREL BISULFATE 75 MG: 75 TABLET ORAL at 09:11

## 2024-01-01 RX ADMIN — METOPROLOL SUCCINATE 25 MG: 25 TABLET, EXTENDED RELEASE ORAL at 08:11

## 2024-01-01 RX ADMIN — HYDROCODONE BITARTRATE AND ACETAMINOPHEN 1 TABLET: 5; 325 TABLET ORAL at 01:11

## 2024-01-01 RX ADMIN — INSULIN ASPART 6 UNITS: 100 INJECTION, SOLUTION INTRAVENOUS; SUBCUTANEOUS at 01:11

## 2024-07-30 ENCOUNTER — HOSPITAL ENCOUNTER (INPATIENT)
Facility: HOSPITAL | Age: 54
LOS: 4 days | Discharge: HOME | DRG: 286 | End: 2024-08-03
Attending: FAMILY MEDICINE | Admitting: FAMILY MEDICINE
Payer: COMMERCIAL

## 2024-07-30 DIAGNOSIS — Z95.1 S/P CABG X 2: ICD-10-CM

## 2024-07-30 DIAGNOSIS — J45.909 ASTHMA, UNSPECIFIED ASTHMA SEVERITY, UNSPECIFIED WHETHER COMPLICATED, UNSPECIFIED WHETHER PERSISTENT: ICD-10-CM

## 2024-07-30 DIAGNOSIS — I25.10 CORONARY ARTERY DISEASE INVOLVING NATIVE CORONARY ARTERY OF NATIVE HEART, UNSPECIFIED WHETHER ANGINA PRESENT: ICD-10-CM

## 2024-07-30 DIAGNOSIS — I25.10 CORONARY ARTERY DISEASE, UNSPECIFIED VESSEL OR LESION TYPE, UNSPECIFIED WHETHER ANGINA PRESENT, UNSPECIFIED WHETHER NATIVE OR TRANSPLANTED HEART: ICD-10-CM

## 2024-07-30 DIAGNOSIS — R07.9 CHEST PAIN, UNSPECIFIED TYPE: Primary | ICD-10-CM

## 2024-07-30 DIAGNOSIS — R07.9 CHEST PAIN: ICD-10-CM

## 2024-07-30 DIAGNOSIS — I20.0 UNSTABLE ANGINA: ICD-10-CM

## 2024-07-30 DIAGNOSIS — I25.119 CHEST PAIN DUE TO CAD: ICD-10-CM

## 2024-07-30 PROBLEM — E78.5 HYPERLIPIDEMIA ASSOCIATED WITH TYPE 2 DIABETES MELLITUS: Status: ACTIVE | Noted: 2024-07-30

## 2024-07-30 PROBLEM — E11.51 TYPE 2 DIABETES MELLITUS WITH DIABETIC PERIPHERAL ANGIOPATHY WITHOUT GANGRENE, WITHOUT LONG-TERM CURRENT USE OF INSULIN: Status: ACTIVE | Noted: 2024-07-30

## 2024-07-30 PROBLEM — E11.69 HYPERLIPIDEMIA ASSOCIATED WITH TYPE 2 DIABETES MELLITUS: Status: ACTIVE | Noted: 2024-07-30

## 2024-07-30 PROBLEM — I16.0 HYPERTENSIVE URGENCY: Status: ACTIVE | Noted: 2024-07-30

## 2024-07-30 PROBLEM — N18.6 ESRD (END STAGE RENAL DISEASE): Status: ACTIVE | Noted: 2024-07-30

## 2024-07-30 LAB
CRP SERPL-MCNC: <0.2 MG/DL (ref 0–0.8)
D DIMER PPP FEU-MCNC: 5.13 ΜG/ML (ref 0–0.47)
ERYTHROCYTE [SEDIMENTATION RATE] IN BLOOD BY WESTERGREN METHOD: 38 MM/HR (ref 0–20)
EST. AVERAGE GLUCOSE BLD GHB EST-MCNC: 120 MG/DL
GLUCOSE SERPL-MCNC: 174 MG/DL (ref 70–105)
HBA1C MFR BLD HPLC: 5.8 % (ref 4.5–6.6)
HCO3 UR-SCNC: 36 MMOL/L (ref 21–28)
NT-PROBNP SERPL-MCNC: ABNORMAL PG/ML (ref 1–125)
PCO2 BLDA: 53 MMHG (ref 35–48)
PH SMN: 7.44 [PH] (ref 7.35–7.45)
PO2 BLDA: 84 MMHG (ref 83–108)
POC BASE EXCESS: 10.1 MMOL/L (ref -2–3)
POC SATURATED O2: 97 % (ref 95–98)
TROPONIN I SERPL DL<=0.01 NG/ML-MCNC: 32.6 PG/ML
TROPONIN I SERPL DL<=0.01 NG/ML-MCNC: 33.8 PG/ML

## 2024-07-30 PROCEDURE — 82962 GLUCOSE BLOOD TEST: CPT

## 2024-07-30 PROCEDURE — 82803 BLOOD GASES ANY COMBINATION: CPT

## 2024-07-30 PROCEDURE — 83036 HEMOGLOBIN GLYCOSYLATED A1C: CPT | Performed by: HOSPITALIST

## 2024-07-30 PROCEDURE — 99900035 HC TECH TIME PER 15 MIN (STAT)

## 2024-07-30 PROCEDURE — 83880 ASSAY OF NATRIURETIC PEPTIDE: CPT | Performed by: HOSPITALIST

## 2024-07-30 PROCEDURE — 27000221 HC OXYGEN, UP TO 24 HOURS

## 2024-07-30 PROCEDURE — 93010 ELECTROCARDIOGRAM REPORT: CPT | Mod: ,,, | Performed by: INTERNAL MEDICINE

## 2024-07-30 PROCEDURE — 86140 C-REACTIVE PROTEIN: CPT | Performed by: HOSPITALIST

## 2024-07-30 PROCEDURE — 25000242 PHARM REV CODE 250 ALT 637 W/ HCPCS: Performed by: HOSPITALIST

## 2024-07-30 PROCEDURE — 99223 1ST HOSP IP/OBS HIGH 75: CPT | Mod: ,,, | Performed by: HOSPITALIST

## 2024-07-30 PROCEDURE — 84484 ASSAY OF TROPONIN QUANT: CPT | Performed by: HOSPITALIST

## 2024-07-30 PROCEDURE — 85379 FIBRIN DEGRADATION QUANT: CPT | Performed by: HOSPITALIST

## 2024-07-30 PROCEDURE — 85651 RBC SED RATE NONAUTOMATED: CPT | Performed by: HOSPITALIST

## 2024-07-30 PROCEDURE — 94640 AIRWAY INHALATION TREATMENT: CPT

## 2024-07-30 PROCEDURE — 63600175 PHARM REV CODE 636 W HCPCS: Performed by: HOSPITALIST

## 2024-07-30 PROCEDURE — 36415 COLL VENOUS BLD VENIPUNCTURE: CPT | Performed by: HOSPITALIST

## 2024-07-30 PROCEDURE — 93005 ELECTROCARDIOGRAM TRACING: CPT

## 2024-07-30 PROCEDURE — 94761 N-INVAS EAR/PLS OXIMETRY MLT: CPT

## 2024-07-30 PROCEDURE — 63600175 PHARM REV CODE 636 W HCPCS: Performed by: INTERNAL MEDICINE

## 2024-07-30 PROCEDURE — 84484 ASSAY OF TROPONIN QUANT: CPT | Performed by: INTERNAL MEDICINE

## 2024-07-30 PROCEDURE — 84484 ASSAY OF TROPONIN QUANT: CPT | Performed by: NURSE PRACTITIONER

## 2024-07-30 PROCEDURE — 11000001 HC ACUTE MED/SURG PRIVATE ROOM

## 2024-07-30 PROCEDURE — 36600 WITHDRAWAL OF ARTERIAL BLOOD: CPT

## 2024-07-30 PROCEDURE — 25000003 PHARM REV CODE 250: Performed by: HOSPITALIST

## 2024-07-30 PROCEDURE — 93010 ELECTROCARDIOGRAM REPORT: CPT | Mod: 77,,, | Performed by: HOSPITALIST

## 2024-07-30 RX ORDER — FUROSEMIDE 10 MG/ML
20 INJECTION INTRAMUSCULAR; INTRAVENOUS DAILY
Status: DISCONTINUED | OUTPATIENT
Start: 2024-07-30 | End: 2024-07-31

## 2024-07-30 RX ORDER — IPRATROPIUM BROMIDE AND ALBUTEROL SULFATE 2.5; .5 MG/3ML; MG/3ML
3 SOLUTION RESPIRATORY (INHALATION)
Status: DISCONTINUED | OUTPATIENT
Start: 2024-07-30 | End: 2024-08-03 | Stop reason: HOSPADM

## 2024-07-30 RX ORDER — TALC
6 POWDER (GRAM) TOPICAL NIGHTLY PRN
Status: DISCONTINUED | OUTPATIENT
Start: 2024-07-30 | End: 2024-08-03 | Stop reason: HOSPADM

## 2024-07-30 RX ORDER — LANOLIN ALCOHOL/MO/W.PET/CERES
800 CREAM (GRAM) TOPICAL
Status: DISCONTINUED | OUTPATIENT
Start: 2024-07-30 | End: 2024-08-03 | Stop reason: HOSPADM

## 2024-07-30 RX ORDER — ALUMINUM HYDROXIDE, MAGNESIUM HYDROXIDE, AND SIMETHICONE 2400; 240; 2400 MG/30ML; MG/30ML; MG/30ML
15 SUSPENSION ORAL 4 TIMES DAILY PRN
Status: DISCONTINUED | OUTPATIENT
Start: 2024-07-30 | End: 2024-08-03 | Stop reason: HOSPADM

## 2024-07-30 RX ORDER — SODIUM,POTASSIUM PHOSPHATES 280-250MG
2 POWDER IN PACKET (EA) ORAL
Status: DISCONTINUED | OUTPATIENT
Start: 2024-07-30 | End: 2024-08-03 | Stop reason: HOSPADM

## 2024-07-30 RX ORDER — HYDRALAZINE HYDROCHLORIDE 20 MG/ML
10 INJECTION INTRAMUSCULAR; INTRAVENOUS EVERY 6 HOURS PRN
Status: DISCONTINUED | OUTPATIENT
Start: 2024-07-30 | End: 2024-08-03 | Stop reason: HOSPADM

## 2024-07-30 RX ORDER — SODIUM CHLORIDE 0.9 % (FLUSH) 0.9 %
10 SYRINGE (ML) INJECTION
Status: DISCONTINUED | OUTPATIENT
Start: 2024-07-30 | End: 2024-08-03 | Stop reason: HOSPADM

## 2024-07-30 RX ORDER — HYDRALAZINE HYDROCHLORIDE 20 MG/ML
10 INJECTION INTRAMUSCULAR; INTRAVENOUS ONCE
Status: COMPLETED | OUTPATIENT
Start: 2024-07-30 | End: 2024-07-30

## 2024-07-30 RX ORDER — POLYETHYLENE GLYCOL 3350 17 G/17G
17 POWDER, FOR SOLUTION ORAL DAILY PRN
Status: DISCONTINUED | OUTPATIENT
Start: 2024-07-30 | End: 2024-08-03 | Stop reason: HOSPADM

## 2024-07-30 RX ORDER — ONDANSETRON HYDROCHLORIDE 2 MG/ML
4 INJECTION, SOLUTION INTRAVENOUS EVERY 8 HOURS PRN
Status: DISCONTINUED | OUTPATIENT
Start: 2024-07-30 | End: 2024-08-03 | Stop reason: HOSPADM

## 2024-07-30 RX ORDER — ACETAMINOPHEN 325 MG/1
650 TABLET ORAL EVERY 8 HOURS PRN
Status: DISCONTINUED | OUTPATIENT
Start: 2024-07-30 | End: 2024-08-01

## 2024-07-30 RX ORDER — ACETAMINOPHEN 500 MG
1000 TABLET ORAL EVERY 8 HOURS PRN
Status: DISCONTINUED | OUTPATIENT
Start: 2024-07-30 | End: 2024-08-03 | Stop reason: HOSPADM

## 2024-07-30 RX ORDER — CARVEDILOL 12.5 MG/1
12.5 TABLET ORAL 2 TIMES DAILY
Status: DISCONTINUED | OUTPATIENT
Start: 2024-07-30 | End: 2024-08-03 | Stop reason: HOSPADM

## 2024-07-30 RX ORDER — HEPARIN SODIUM 5000 [USP'U]/ML
5000 INJECTION, SOLUTION INTRAVENOUS; SUBCUTANEOUS EVERY 12 HOURS
Status: DISCONTINUED | OUTPATIENT
Start: 2024-07-30 | End: 2024-08-03 | Stop reason: HOSPADM

## 2024-07-30 RX ORDER — LISINOPRIL 20 MG/1
20 TABLET ORAL DAILY
Status: DISCONTINUED | OUTPATIENT
Start: 2024-07-31 | End: 2024-08-03 | Stop reason: HOSPADM

## 2024-07-30 RX ORDER — ACETAMINOPHEN 325 MG/1
650 TABLET ORAL EVERY 4 HOURS PRN
Status: DISCONTINUED | OUTPATIENT
Start: 2024-07-30 | End: 2024-08-03 | Stop reason: HOSPADM

## 2024-07-30 RX ORDER — NALOXONE HCL 0.4 MG/ML
0.02 VIAL (ML) INJECTION
Status: DISCONTINUED | OUTPATIENT
Start: 2024-07-30 | End: 2024-08-03 | Stop reason: HOSPADM

## 2024-07-30 RX ORDER — HYDRALAZINE HYDROCHLORIDE 25 MG/1
25 TABLET, FILM COATED ORAL EVERY 8 HOURS
Status: DISCONTINUED | OUTPATIENT
Start: 2024-07-30 | End: 2024-08-03 | Stop reason: HOSPADM

## 2024-07-30 RX ORDER — AMLODIPINE BESYLATE 5 MG/1
5 TABLET ORAL DAILY
Status: DISCONTINUED | OUTPATIENT
Start: 2024-07-30 | End: 2024-07-31

## 2024-07-30 RX ADMIN — HYDRALAZINE HYDROCHLORIDE 10 MG: 20 INJECTION INTRAMUSCULAR; INTRAVENOUS at 11:07

## 2024-07-30 RX ADMIN — IPRATROPIUM BROMIDE AND ALBUTEROL SULFATE 3 ML: 2.5; .5 SOLUTION RESPIRATORY (INHALATION) at 08:07

## 2024-07-30 RX ADMIN — HEPARIN SODIUM 5000 UNITS: 5000 INJECTION, SOLUTION INTRAVENOUS; SUBCUTANEOUS at 08:07

## 2024-07-30 RX ADMIN — FUROSEMIDE 20 MG: 10 INJECTION, SOLUTION INTRAMUSCULAR; INTRAVENOUS at 06:07

## 2024-07-30 RX ADMIN — AMLODIPINE BESYLATE 5 MG: 5 TABLET ORAL at 06:07

## 2024-07-30 RX ADMIN — HYDRALAZINE HYDROCHLORIDE 10 MG: 20 INJECTION INTRAMUSCULAR; INTRAVENOUS at 09:07

## 2024-07-30 RX ADMIN — CARVEDILOL 12.5 MG: 12.5 TABLET, FILM COATED ORAL at 06:07

## 2024-07-31 PROBLEM — E66.9 OBESITY (BMI 30-39.9): Status: ACTIVE | Noted: 2024-07-31

## 2024-07-31 PROBLEM — Z89.511 HX OF RIGHT BKA: Status: ACTIVE | Noted: 2024-07-31

## 2024-07-31 PROBLEM — Z87.74 S/P PATENT FORAMEN OVALE CLOSURE: Status: ACTIVE | Noted: 2024-07-31

## 2024-07-31 PROBLEM — R79.89 ELEVATED D-DIMER: Status: ACTIVE | Noted: 2024-07-31

## 2024-07-31 PROBLEM — R07.9 CHEST PAIN: Status: ACTIVE | Noted: 2024-07-31

## 2024-07-31 PROBLEM — B37.2 YEAST DERMATITIS: Status: ACTIVE | Noted: 2024-07-31

## 2024-07-31 LAB
ALBUMIN SERPL BCP-MCNC: 3 G/DL (ref 3.5–5)
ANION GAP SERPL CALCULATED.3IONS-SCNC: 11 MMOL/L (ref 7–16)
AORTIC ROOT ANNULUS: 3.32 CM
AORTIC VALVE CUSP SEPERATION: 2.27 CM
AV INDEX (PROSTH): 0.55
AV MEAN GRADIENT: 7 MMHG
AV PEAK GRADIENT: 14 MMHG
AV VALVE AREA BY VELOCITY RATIO: 2.02 CM²
AV VALVE AREA: 1.97 CM²
AV VELOCITY RATIO: 0.56
BASOPHILS # BLD AUTO: 0.02 K/UL (ref 0–0.2)
BASOPHILS NFR BLD AUTO: 0.4 % (ref 0–1)
BSA FOR ECHO PROCEDURE: 2.18 M2
BUN SERPL-MCNC: 49 MG/DL (ref 7–18)
BUN/CREAT SERPL: 7 (ref 6–20)
CALCIUM SERPL-MCNC: 8.6 MG/DL (ref 8.5–10.1)
CHLORIDE SERPL-SCNC: 96 MMOL/L (ref 98–107)
CO2 SERPL-SCNC: 30 MMOL/L (ref 21–32)
CREAT SERPL-MCNC: 6.68 MG/DL (ref 0.7–1.3)
CV ECHO LV RWT: 0.57 CM
DIFFERENTIAL METHOD BLD: ABNORMAL
DOP CALC AO PEAK VEL: 1.89 M/S
DOP CALC AO VTI: 44.6 CM
DOP CALC LVOT AREA: 3.6 CM2
DOP CALC LVOT DIAMETER: 2.14 CM
DOP CALC LVOT PEAK VEL: 1.06 M/S
DOP CALC LVOT STROKE VOLUME: 88.08 CM3
DOP CALCLVOT PEAK VEL VTI: 24.5 CM
E WAVE DECELERATION TIME: 240.35 MSEC
E/A RATIO: 1.49
E/E' RATIO: 17.71 M/S
ECHO LV POSTERIOR WALL: 1.37 CM (ref 0.6–1.1)
EGFR (NO RACE VARIABLE) (RUSH/TITUS): 9 ML/MIN/1.73M2
EJECTION FRACTION: 55 %
EOSINOPHIL # BLD AUTO: 0.3 K/UL (ref 0–0.5)
EOSINOPHIL NFR BLD AUTO: 5.9 % (ref 1–4)
ERYTHROCYTE [DISTWIDTH] IN BLOOD BY AUTOMATED COUNT: 14.7 % (ref 11.5–14.5)
FRACTIONAL SHORTENING: 32 % (ref 28–44)
GLUCOSE SERPL-MCNC: 108 MG/DL (ref 70–105)
GLUCOSE SERPL-MCNC: 138 MG/DL (ref 70–105)
GLUCOSE SERPL-MCNC: 145 MG/DL (ref 70–105)
GLUCOSE SERPL-MCNC: 146 MG/DL (ref 70–105)
GLUCOSE SERPL-MCNC: 158 MG/DL (ref 74–106)
HCT VFR BLD AUTO: 33 % (ref 40–54)
HGB BLD-MCNC: 10.3 G/DL (ref 13.5–18)
IMM GRANULOCYTES # BLD AUTO: 0.02 K/UL (ref 0–0.04)
IMM GRANULOCYTES NFR BLD: 0.4 % (ref 0–0.4)
INTERVENTRICULAR SEPTUM: 1.48 CM (ref 0.6–1.1)
IVC DIAMETER: 2.95 CM
LEFT ATRIUM AREA SYSTOLIC (APICAL 4 CHAMBER): 22.94 CM2
LEFT ATRIUM SIZE: 4.23 CM
LEFT INTERNAL DIMENSION IN SYSTOLE: 3.26 CM (ref 2.1–4)
LEFT VENTRICLE DIASTOLIC VOLUME INDEX: 50.41 ML/M2
LEFT VENTRICLE DIASTOLIC VOLUME: 107.37 ML
LEFT VENTRICLE END SYSTOLIC VOLUME APICAL 4 CHAMBER: 66.42 ML
LEFT VENTRICLE MASS INDEX: 132 G/M2
LEFT VENTRICLE SYSTOLIC VOLUME INDEX: 20 ML/M2
LEFT VENTRICLE SYSTOLIC VOLUME: 42.7 ML
LEFT VENTRICULAR INTERNAL DIMENSION IN DIASTOLE: 4.8 CM (ref 3.5–6)
LEFT VENTRICULAR MASS: 281.07 G
LV LATERAL E/E' RATIO: 15.5 M/S
LV SEPTAL E/E' RATIO: 20.67 M/S
LVED V (TEICH): 107.37 ML
LVES V (TEICH): 42.7 ML
LVOT MG: 2.67 MMHG
LVOT MV: 0.78 CM/S
LYMPHOCYTES # BLD AUTO: 0.98 K/UL (ref 1–4.8)
LYMPHOCYTES NFR BLD AUTO: 19.4 % (ref 27–41)
MAGNESIUM SERPL-MCNC: 2.7 MG/DL (ref 1.7–2.3)
MCH RBC QN AUTO: 32.3 PG (ref 27–31)
MCHC RBC AUTO-ENTMCNC: 31.2 G/DL (ref 32–36)
MCV RBC AUTO: 103.4 FL (ref 80–96)
MONOCYTES # BLD AUTO: 0.53 K/UL (ref 0–0.8)
MONOCYTES NFR BLD AUTO: 10.5 % (ref 2–6)
MPC BLD CALC-MCNC: 10.8 FL (ref 9.4–12.4)
MV PEAK A VEL: 0.83 M/S
MV PEAK E VEL: 1.24 M/S
NEUTROPHILS # BLD AUTO: 3.21 K/UL (ref 1.8–7.7)
NEUTROPHILS NFR BLD AUTO: 63.4 % (ref 53–65)
NRBC # BLD AUTO: 0 X10E3/UL
NRBC, AUTO (.00): 0 %
OHS QRS DURATION: 114 MS
OHS QTC CALCULATION: 464 MS
PHOSPHATE SERPL-MCNC: 7.5 MG/DL (ref 2.5–4.5)
PISA TR MAX VEL: 3.58 M/S
PLATELET # BLD AUTO: 110 K/UL (ref 150–400)
POTASSIUM SERPL-SCNC: 3.8 MMOL/L (ref 3.5–5.1)
PV PEAK GRADIENT: 3 MMHG
PV PEAK VELOCITY: 0.86 M/S
RA MAJOR: 5.09 CM
RA PRESSURE ESTIMATED: 15 MMHG
RBC # BLD AUTO: 3.19 M/UL (ref 4.6–6.2)
RIGHT VENTRICLE DIASTOLIC BASEL DIMENSION: 4.7 CM
RIGHT VENTRICLE DIASTOLIC LENGTH: 7.5 CM
RIGHT VENTRICLE DIASTOLIC MID DIMENSION: 4.2 CM
RIGHT VENTRICULAR LENGTH IN DIASTOLE (APICAL 4-CHAMBER VIEW): 7.48 CM
RV MID DIAMA: 4.24 CM
RV TB RVSP: 19 MMHG
SODIUM SERPL-SCNC: 133 MMOL/L (ref 136–145)
TDI LATERAL: 0.08 M/S
TDI SEPTAL: 0.06 M/S
TDI: 0.07 M/S
TR MAX PG: 51 MMHG
TRICUSPID ANNULAR PLANE SYSTOLIC EXCURSION: 2 CM
TROPONIN I SERPL DL<=0.01 NG/ML-MCNC: 29.7 PG/ML
TROPONIN I SERPL DL<=0.01 NG/ML-MCNC: 35.1 PG/ML
TV REST PULMONARY ARTERY PRESSURE: 66 MMHG
WBC # BLD AUTO: 5.06 K/UL (ref 4.5–11)
Z-SCORE OF LEFT VENTRICULAR DIMENSION IN END DIASTOLE: -3.47
Z-SCORE OF LEFT VENTRICULAR DIMENSION IN END SYSTOLE: -1.89

## 2024-07-31 PROCEDURE — 63600175 PHARM REV CODE 636 W HCPCS: Performed by: HOSPITALIST

## 2024-07-31 PROCEDURE — 85025 COMPLETE CBC W/AUTO DIFF WBC: CPT | Performed by: HOSPITALIST

## 2024-07-31 PROCEDURE — 25500020 PHARM REV CODE 255: Performed by: HOSPITALIST

## 2024-07-31 PROCEDURE — 94761 N-INVAS EAR/PLS OXIMETRY MLT: CPT

## 2024-07-31 PROCEDURE — 11000001 HC ACUTE MED/SURG PRIVATE ROOM

## 2024-07-31 PROCEDURE — 80100014 HC HEMODIALYSIS 1:1

## 2024-07-31 PROCEDURE — 27000221 HC OXYGEN, UP TO 24 HOURS

## 2024-07-31 PROCEDURE — 94640 AIRWAY INHALATION TREATMENT: CPT

## 2024-07-31 PROCEDURE — 25000003 PHARM REV CODE 250: Performed by: NURSE PRACTITIONER

## 2024-07-31 PROCEDURE — 82962 GLUCOSE BLOOD TEST: CPT

## 2024-07-31 PROCEDURE — 80069 RENAL FUNCTION PANEL: CPT | Performed by: HOSPITALIST

## 2024-07-31 PROCEDURE — 99233 SBSQ HOSP IP/OBS HIGH 50: CPT | Mod: ,,, | Performed by: HOSPITALIST

## 2024-07-31 PROCEDURE — 99900035 HC TECH TIME PER 15 MIN (STAT)

## 2024-07-31 PROCEDURE — 84484 ASSAY OF TROPONIN QUANT: CPT | Performed by: HOSPITALIST

## 2024-07-31 PROCEDURE — 90935 HEMODIALYSIS ONE EVALUATION: CPT

## 2024-07-31 PROCEDURE — 36415 COLL VENOUS BLD VENIPUNCTURE: CPT | Performed by: HOSPITALIST

## 2024-07-31 PROCEDURE — 25000242 PHARM REV CODE 250 ALT 637 W/ HCPCS: Performed by: HOSPITALIST

## 2024-07-31 PROCEDURE — 25000003 PHARM REV CODE 250: Performed by: HOSPITALIST

## 2024-07-31 PROCEDURE — 83735 ASSAY OF MAGNESIUM: CPT | Performed by: HOSPITALIST

## 2024-07-31 PROCEDURE — 99223 1ST HOSP IP/OBS HIGH 75: CPT | Mod: ,,, | Performed by: INTERNAL MEDICINE

## 2024-07-31 RX ORDER — IBUPROFEN 200 MG
24 TABLET ORAL
Status: DISCONTINUED | OUTPATIENT
Start: 2024-07-31 | End: 2024-08-03 | Stop reason: HOSPADM

## 2024-07-31 RX ORDER — IOPAMIDOL 755 MG/ML
100 INJECTION, SOLUTION INTRAVASCULAR
Status: COMPLETED | OUTPATIENT
Start: 2024-07-31 | End: 2024-07-31

## 2024-07-31 RX ORDER — MUPIROCIN 20 MG/G
OINTMENT TOPICAL 2 TIMES DAILY
Status: DISCONTINUED | OUTPATIENT
Start: 2024-07-31 | End: 2024-08-03 | Stop reason: HOSPADM

## 2024-07-31 RX ORDER — SEVELAMER CARBONATE 800 MG/1
1600 TABLET, FILM COATED ORAL
Status: DISCONTINUED | OUTPATIENT
Start: 2024-08-01 | End: 2024-08-03 | Stop reason: HOSPADM

## 2024-07-31 RX ORDER — INSULIN ASPART 100 [IU]/ML
0-5 INJECTION, SOLUTION INTRAVENOUS; SUBCUTANEOUS
Status: DISCONTINUED | OUTPATIENT
Start: 2024-07-31 | End: 2024-08-03 | Stop reason: HOSPADM

## 2024-07-31 RX ORDER — SODIUM CHLORIDE 9 MG/ML
INJECTION, SOLUTION INTRAVENOUS
OUTPATIENT
Start: 2024-07-31

## 2024-07-31 RX ORDER — AMLODIPINE BESYLATE 10 MG/1
10 TABLET ORAL DAILY
Status: DISCONTINUED | OUTPATIENT
Start: 2024-08-01 | End: 2024-08-03 | Stop reason: HOSPADM

## 2024-07-31 RX ORDER — MICONAZOLE NITRATE 2 G/100G
POWDER TOPICAL 2 TIMES DAILY
Status: DISCONTINUED | OUTPATIENT
Start: 2024-07-31 | End: 2024-08-03 | Stop reason: HOSPADM

## 2024-07-31 RX ORDER — IBUPROFEN 200 MG
16 TABLET ORAL
Status: DISCONTINUED | OUTPATIENT
Start: 2024-07-31 | End: 2024-08-03 | Stop reason: HOSPADM

## 2024-07-31 RX ORDER — GLUCAGON 1 MG
1 KIT INJECTION
Status: DISCONTINUED | OUTPATIENT
Start: 2024-07-31 | End: 2024-08-03 | Stop reason: HOSPADM

## 2024-07-31 RX ADMIN — HEPARIN SODIUM 5000 UNITS: 5000 INJECTION, SOLUTION INTRAVENOUS; SUBCUTANEOUS at 09:07

## 2024-07-31 RX ADMIN — IPRATROPIUM BROMIDE AND ALBUTEROL SULFATE 3 ML: 2.5; .5 SOLUTION RESPIRATORY (INHALATION) at 07:07

## 2024-07-31 RX ADMIN — HYDRALAZINE HYDROCHLORIDE 25 MG: 25 TABLET ORAL at 03:07

## 2024-07-31 RX ADMIN — PERFLUTREN 1.5 ML: 6.52 INJECTION, SUSPENSION INTRAVENOUS at 06:07

## 2024-07-31 RX ADMIN — CARVEDILOL 12.5 MG: 12.5 TABLET, FILM COATED ORAL at 08:07

## 2024-07-31 RX ADMIN — IOPAMIDOL 100 ML: 755 INJECTION, SOLUTION INTRAVENOUS at 09:07

## 2024-07-31 RX ADMIN — HYDRALAZINE HYDROCHLORIDE 10 MG: 20 INJECTION INTRAMUSCULAR; INTRAVENOUS at 08:07

## 2024-07-31 RX ADMIN — FUROSEMIDE 20 MG: 10 INJECTION, SOLUTION INTRAMUSCULAR; INTRAVENOUS at 09:07

## 2024-07-31 RX ADMIN — MICONAZOLE NITRATE ANTIFUNGAL POWDER: 2 POWDER TOPICAL at 08:07

## 2024-07-31 RX ADMIN — AMLODIPINE BESYLATE 5 MG: 5 TABLET ORAL at 09:07

## 2024-07-31 RX ADMIN — IPRATROPIUM BROMIDE AND ALBUTEROL SULFATE 3 ML: 2.5; .5 SOLUTION RESPIRATORY (INHALATION) at 02:07

## 2024-07-31 RX ADMIN — LISINOPRIL 20 MG: 20 TABLET ORAL at 09:07

## 2024-07-31 RX ADMIN — MUPIROCIN: 20 OINTMENT TOPICAL at 08:07

## 2024-07-31 RX ADMIN — HEPARIN SODIUM 5000 UNITS: 5000 INJECTION, SOLUTION INTRAVENOUS; SUBCUTANEOUS at 08:07

## 2024-07-31 RX ADMIN — HYDRALAZINE HYDROCHLORIDE 25 MG: 25 TABLET ORAL at 08:07

## 2024-07-31 RX ADMIN — CARVEDILOL 12.5 MG: 12.5 TABLET, FILM COATED ORAL at 09:07

## 2024-07-31 RX ADMIN — HYDRALAZINE HYDROCHLORIDE 25 MG: 25 TABLET ORAL at 05:07

## 2024-08-01 PROBLEM — R07.9 CHEST PAIN: Status: ACTIVE | Noted: 2024-08-01

## 2024-08-01 PROBLEM — I25.119 CHEST PAIN DUE TO CAD: Status: ACTIVE | Noted: 2024-07-31

## 2024-08-01 LAB
ALBUMIN SERPL BCP-MCNC: 3.1 G/DL (ref 3.5–5)
ANION GAP SERPL CALCULATED.3IONS-SCNC: 14 MMOL/L (ref 7–16)
BASOPHILS # BLD AUTO: 0.02 K/UL (ref 0–0.2)
BASOPHILS NFR BLD AUTO: 0.4 % (ref 0–1)
BUN SERPL-MCNC: 35 MG/DL (ref 7–18)
BUN/CREAT SERPL: 7 (ref 6–20)
CALCIUM SERPL-MCNC: 8.1 MG/DL (ref 8.5–10.1)
CATH EF QUANTITATIVE: 40 %
CHLORIDE SERPL-SCNC: 97 MMOL/L (ref 98–107)
CO2 SERPL-SCNC: 28 MMOL/L (ref 21–32)
CREAT SERPL-MCNC: 5.24 MG/DL (ref 0.7–1.3)
DIFFERENTIAL METHOD BLD: ABNORMAL
EGFR (NO RACE VARIABLE) (RUSH/TITUS): 12 ML/MIN/1.73M2
EOSINOPHIL # BLD AUTO: 0.29 K/UL (ref 0–0.5)
EOSINOPHIL NFR BLD AUTO: 5.6 % (ref 1–4)
ERYTHROCYTE [DISTWIDTH] IN BLOOD BY AUTOMATED COUNT: 15 % (ref 11.5–14.5)
GLUCOSE SERPL-MCNC: 104 MG/DL (ref 70–105)
GLUCOSE SERPL-MCNC: 110 MG/DL (ref 74–106)
GLUCOSE SERPL-MCNC: 254 MG/DL (ref 70–105)
GLUCOSE SERPL-MCNC: 98 MG/DL (ref 70–105)
HCT VFR BLD AUTO: 31.5 % (ref 40–54)
HGB BLD-MCNC: 10.2 G/DL (ref 13.5–18)
IMM GRANULOCYTES # BLD AUTO: 0.01 K/UL (ref 0–0.04)
IMM GRANULOCYTES NFR BLD: 0.2 % (ref 0–0.4)
LYMPHOCYTES # BLD AUTO: 0.89 K/UL (ref 1–4.8)
LYMPHOCYTES NFR BLD AUTO: 17.2 % (ref 27–41)
MAGNESIUM SERPL-MCNC: 2.5 MG/DL (ref 1.7–2.3)
MCH RBC QN AUTO: 33 PG (ref 27–31)
MCHC RBC AUTO-ENTMCNC: 32.4 G/DL (ref 32–36)
MCV RBC AUTO: 101.9 FL (ref 80–96)
MONOCYTES # BLD AUTO: 0.57 K/UL (ref 0–0.8)
MONOCYTES NFR BLD AUTO: 11 % (ref 2–6)
MPC BLD CALC-MCNC: 10.2 FL (ref 9.4–12.4)
NEUTROPHILS # BLD AUTO: 3.4 K/UL (ref 1.8–7.7)
NEUTROPHILS NFR BLD AUTO: 65.6 % (ref 53–65)
NRBC # BLD AUTO: 0 X10E3/UL
NRBC, AUTO (.00): 0 %
OHS QRS DURATION: 116 MS
OHS QTC CALCULATION: 494 MS
PHOSPHATE SERPL-MCNC: 5.8 MG/DL (ref 2.5–4.5)
PLATELET # BLD AUTO: 110 K/UL (ref 150–400)
POTASSIUM SERPL-SCNC: 4.1 MMOL/L (ref 3.5–5.1)
RBC # BLD AUTO: 3.09 M/UL (ref 4.6–6.2)
SODIUM SERPL-SCNC: 135 MMOL/L (ref 136–145)
TROPONIN I SERPL DL<=0.01 NG/ML-MCNC: 25 PG/ML
TROPONIN I SERPL DL<=0.01 NG/ML-MCNC: 25.8 PG/ML
TROPONIN I SERPL DL<=0.01 NG/ML-MCNC: 34.1 PG/ML
WBC # BLD AUTO: 5.18 K/UL (ref 4.5–11)

## 2024-08-01 PROCEDURE — 84484 ASSAY OF TROPONIN QUANT: CPT | Performed by: INTERNAL MEDICINE

## 2024-08-01 PROCEDURE — 25000003 PHARM REV CODE 250: Performed by: INTERNAL MEDICINE

## 2024-08-01 PROCEDURE — 27000221 HC OXYGEN, UP TO 24 HOURS

## 2024-08-01 PROCEDURE — 83735 ASSAY OF MAGNESIUM: CPT | Performed by: HOSPITALIST

## 2024-08-01 PROCEDURE — 25000242 PHARM REV CODE 250 ALT 637 W/ HCPCS: Performed by: INTERNAL MEDICINE

## 2024-08-01 PROCEDURE — B2101ZZ FLUOROSCOPY OF SINGLE CORONARY ARTERY USING LOW OSMOLAR CONTRAST: ICD-10-PCS | Performed by: INTERNAL MEDICINE

## 2024-08-01 PROCEDURE — 80069 RENAL FUNCTION PANEL: CPT | Performed by: HOSPITALIST

## 2024-08-01 PROCEDURE — 36415 COLL VENOUS BLD VENIPUNCTURE: CPT | Performed by: INTERNAL MEDICINE

## 2024-08-01 PROCEDURE — 4A023N7 MEASUREMENT OF CARDIAC SAMPLING AND PRESSURE, LEFT HEART, PERCUTANEOUS APPROACH: ICD-10-PCS | Performed by: INTERNAL MEDICINE

## 2024-08-01 PROCEDURE — 25000242 PHARM REV CODE 250 ALT 637 W/ HCPCS: Performed by: HOSPITALIST

## 2024-08-01 PROCEDURE — 63600175 PHARM REV CODE 636 W HCPCS: Performed by: HOSPITALIST

## 2024-08-01 PROCEDURE — 36415 COLL VENOUS BLD VENIPUNCTURE: CPT | Performed by: HOSPITALIST

## 2024-08-01 PROCEDURE — 82962 GLUCOSE BLOOD TEST: CPT

## 2024-08-01 PROCEDURE — 27201423 OPTIME MED/SURG SUP & DEVICES STERILE SUPPLY: Performed by: INTERNAL MEDICINE

## 2024-08-01 PROCEDURE — C1894 INTRO/SHEATH, NON-LASER: HCPCS | Performed by: INTERNAL MEDICINE

## 2024-08-01 PROCEDURE — 99900035 HC TECH TIME PER 15 MIN (STAT)

## 2024-08-01 PROCEDURE — 99233 SBSQ HOSP IP/OBS HIGH 50: CPT | Mod: ,,, | Performed by: HOSPITALIST

## 2024-08-01 PROCEDURE — 94640 AIRWAY INHALATION TREATMENT: CPT

## 2024-08-01 PROCEDURE — 99152 MOD SED SAME PHYS/QHP 5/>YRS: CPT | Performed by: INTERNAL MEDICINE

## 2024-08-01 PROCEDURE — 94761 N-INVAS EAR/PLS OXIMETRY MLT: CPT

## 2024-08-01 PROCEDURE — 93005 ELECTROCARDIOGRAM TRACING: CPT

## 2024-08-01 PROCEDURE — 11000001 HC ACUTE MED/SURG PRIVATE ROOM

## 2024-08-01 PROCEDURE — 63600175 PHARM REV CODE 636 W HCPCS: Performed by: INTERNAL MEDICINE

## 2024-08-01 PROCEDURE — 93010 ELECTROCARDIOGRAM REPORT: CPT | Mod: ,,, | Performed by: INTERNAL MEDICINE

## 2024-08-01 PROCEDURE — C1760 CLOSURE DEV, VASC: HCPCS | Performed by: INTERNAL MEDICINE

## 2024-08-01 PROCEDURE — B2151ZZ FLUOROSCOPY OF LEFT HEART USING LOW OSMOLAR CONTRAST: ICD-10-PCS | Performed by: INTERNAL MEDICINE

## 2024-08-01 PROCEDURE — 25000003 PHARM REV CODE 250: Performed by: HOSPITALIST

## 2024-08-01 PROCEDURE — 99152 MOD SED SAME PHYS/QHP 5/>YRS: CPT | Mod: ,,, | Performed by: INTERNAL MEDICINE

## 2024-08-01 PROCEDURE — 93459 L HRT ART/GRFT ANGIO: CPT | Mod: 26,,, | Performed by: INTERNAL MEDICINE

## 2024-08-01 PROCEDURE — B2181ZZ FLUOROSCOPY OF LEFT INTERNAL MAMMARY BYPASS GRAFT USING LOW OSMOLAR CONTRAST: ICD-10-PCS | Performed by: INTERNAL MEDICINE

## 2024-08-01 PROCEDURE — 99153 MOD SED SAME PHYS/QHP EA: CPT | Performed by: INTERNAL MEDICINE

## 2024-08-01 PROCEDURE — 85025 COMPLETE CBC W/AUTO DIFF WBC: CPT | Performed by: HOSPITALIST

## 2024-08-01 PROCEDURE — 25500020 PHARM REV CODE 255: Performed by: INTERNAL MEDICINE

## 2024-08-01 PROCEDURE — 90935 HEMODIALYSIS ONE EVALUATION: CPT

## 2024-08-01 PROCEDURE — 93459 L HRT ART/GRFT ANGIO: CPT | Performed by: INTERNAL MEDICINE

## 2024-08-01 RX ORDER — IOPAMIDOL 755 MG/ML
INJECTION, SOLUTION INTRAVASCULAR
Status: DISCONTINUED | OUTPATIENT
Start: 2024-08-01 | End: 2024-08-01 | Stop reason: HOSPADM

## 2024-08-01 RX ORDER — HYDRALAZINE HYDROCHLORIDE 25 MG/1
25 TABLET, FILM COATED ORAL EVERY 8 HOURS PRN
Status: DISCONTINUED | OUTPATIENT
Start: 2024-08-01 | End: 2024-08-03 | Stop reason: HOSPADM

## 2024-08-01 RX ORDER — LIDOCAINE HYDROCHLORIDE 10 MG/ML
INJECTION, SOLUTION INFILTRATION; PERINEURAL
Status: DISCONTINUED | OUTPATIENT
Start: 2024-08-01 | End: 2024-08-01 | Stop reason: HOSPADM

## 2024-08-01 RX ORDER — NITROGLYCERIN 0.4 MG/1
0.4 TABLET SUBLINGUAL EVERY 5 MIN PRN
Status: DISCONTINUED | OUTPATIENT
Start: 2024-08-01 | End: 2024-08-03 | Stop reason: HOSPADM

## 2024-08-01 RX ORDER — MIDAZOLAM HYDROCHLORIDE 1 MG/ML
INJECTION INTRAMUSCULAR; INTRAVENOUS
Status: DISCONTINUED | OUTPATIENT
Start: 2024-08-01 | End: 2024-08-01 | Stop reason: HOSPADM

## 2024-08-01 RX ORDER — FENTANYL CITRATE 50 UG/ML
INJECTION, SOLUTION INTRAMUSCULAR; INTRAVENOUS
Status: DISCONTINUED | OUTPATIENT
Start: 2024-08-01 | End: 2024-08-01 | Stop reason: HOSPADM

## 2024-08-01 RX ORDER — SODIUM CHLORIDE 9 MG/ML
INJECTION, SOLUTION INTRAVENOUS
Status: DISCONTINUED | OUTPATIENT
Start: 2024-08-01 | End: 2024-08-03 | Stop reason: HOSPADM

## 2024-08-01 RX ORDER — CLONIDINE HYDROCHLORIDE 0.2 MG/1
0.2 TABLET ORAL 2 TIMES DAILY PRN
Status: DISCONTINUED | OUTPATIENT
Start: 2024-08-01 | End: 2024-08-03 | Stop reason: HOSPADM

## 2024-08-01 RX ORDER — SODIUM CHLORIDE 450 MG/100ML
INJECTION, SOLUTION INTRAVENOUS CONTINUOUS
Status: DISCONTINUED | OUTPATIENT
Start: 2024-08-01 | End: 2024-08-02

## 2024-08-01 RX ADMIN — ACETAMINOPHEN 1000 MG: 500 TABLET ORAL at 01:08

## 2024-08-01 RX ADMIN — MUPIROCIN: 20 OINTMENT TOPICAL at 09:08

## 2024-08-01 RX ADMIN — SEVELAMER CARBONATE 1600 MG: 800 TABLET, FILM COATED ORAL at 06:08

## 2024-08-01 RX ADMIN — IPRATROPIUM BROMIDE AND ALBUTEROL SULFATE 3 ML: 2.5; .5 SOLUTION RESPIRATORY (INHALATION) at 07:08

## 2024-08-01 RX ADMIN — SEVELAMER CARBONATE 1600 MG: 800 TABLET, FILM COATED ORAL at 11:08

## 2024-08-01 RX ADMIN — CARVEDILOL 12.5 MG: 12.5 TABLET, FILM COATED ORAL at 09:08

## 2024-08-01 RX ADMIN — MICONAZOLE NITRATE ANTIFUNGAL POWDER: 2 POWDER TOPICAL at 09:08

## 2024-08-01 RX ADMIN — IPRATROPIUM BROMIDE AND ALBUTEROL SULFATE 3 ML: 2.5; .5 SOLUTION RESPIRATORY (INHALATION) at 08:08

## 2024-08-01 RX ADMIN — HYDRALAZINE HYDROCHLORIDE 25 MG: 25 TABLET ORAL at 09:08

## 2024-08-01 RX ADMIN — HYDRALAZINE HYDROCHLORIDE 25 MG: 25 TABLET ORAL at 06:08

## 2024-08-01 RX ADMIN — SODIUM CHLORIDE 1000 ML: 9 INJECTION, SOLUTION INTRAVENOUS at 01:08

## 2024-08-01 RX ADMIN — HEPARIN SODIUM 5000 UNITS: 5000 INJECTION, SOLUTION INTRAVENOUS; SUBCUTANEOUS at 09:08

## 2024-08-01 RX ADMIN — NITROGLYCERIN 0.4 MG: 0.4 TABLET SUBLINGUAL at 01:08

## 2024-08-01 RX ADMIN — POLYETHYLENE GLYCOL 3350 17 G: 17 POWDER, FOR SOLUTION ORAL at 10:08

## 2024-08-01 RX ADMIN — ACETAMINOPHEN 1000 MG: 500 TABLET ORAL at 11:08

## 2024-08-02 LAB
ALBUMIN SERPL BCP-MCNC: 3 G/DL (ref 3.5–5)
ANION GAP SERPL CALCULATED.3IONS-SCNC: 10 MMOL/L (ref 7–16)
ANISOCYTOSIS BLD QL SMEAR: ABNORMAL
BASOPHILS # BLD AUTO: 0.02 K/UL (ref 0–0.2)
BASOPHILS NFR BLD AUTO: 0.4 % (ref 0–1)
BUN SERPL-MCNC: 27 MG/DL (ref 7–18)
BUN/CREAT SERPL: 6 (ref 6–20)
CALCIUM SERPL-MCNC: 8.2 MG/DL (ref 8.5–10.1)
CHLORIDE SERPL-SCNC: 98 MMOL/L (ref 98–107)
CO2 SERPL-SCNC: 29 MMOL/L (ref 21–32)
CREAT SERPL-MCNC: 4.48 MG/DL (ref 0.7–1.3)
DIFFERENTIAL METHOD BLD: ABNORMAL
EGFR (NO RACE VARIABLE) (RUSH/TITUS): 15 ML/MIN/1.73M2
EOSINOPHIL # BLD AUTO: 0.21 K/UL (ref 0–0.5)
EOSINOPHIL NFR BLD AUTO: 4.6 % (ref 1–4)
ERYTHROCYTE [DISTWIDTH] IN BLOOD BY AUTOMATED COUNT: 14.8 % (ref 11.5–14.5)
GLUCOSE SERPL-MCNC: 101 MG/DL (ref 70–105)
GLUCOSE SERPL-MCNC: 109 MG/DL (ref 70–105)
GLUCOSE SERPL-MCNC: 131 MG/DL (ref 70–105)
GLUCOSE SERPL-MCNC: 131 MG/DL (ref 74–106)
GLUCOSE SERPL-MCNC: 180 MG/DL (ref 70–105)
GLUCOSE SERPL-MCNC: 191 MG/DL (ref 70–105)
HCT VFR BLD AUTO: 32.3 % (ref 40–54)
HGB BLD-MCNC: 9.9 G/DL (ref 13.5–18)
IMM GRANULOCYTES # BLD AUTO: 0.01 K/UL (ref 0–0.04)
IMM GRANULOCYTES NFR BLD: 0.2 % (ref 0–0.4)
LYMPHOCYTES # BLD AUTO: 0.84 K/UL (ref 1–4.8)
LYMPHOCYTES NFR BLD AUTO: 18.2 % (ref 27–41)
MACROCYTES BLD QL SMEAR: ABNORMAL
MAGNESIUM SERPL-MCNC: 2.6 MG/DL (ref 1.7–2.3)
MCH RBC QN AUTO: 32.5 PG (ref 27–31)
MCHC RBC AUTO-ENTMCNC: 30.7 G/DL (ref 32–36)
MCV RBC AUTO: 105.9 FL (ref 80–96)
MONOCYTES # BLD AUTO: 0.58 K/UL (ref 0–0.8)
MONOCYTES NFR BLD AUTO: 12.6 % (ref 2–6)
MPC BLD CALC-MCNC: 10.5 FL (ref 9.4–12.4)
NEUTROPHILS # BLD AUTO: 2.95 K/UL (ref 1.8–7.7)
NEUTROPHILS NFR BLD AUTO: 64 % (ref 53–65)
NRBC # BLD AUTO: 0 X10E3/UL
NRBC, AUTO (.00): 0 %
PHOSPHATE SERPL-MCNC: 4.4 MG/DL (ref 2.5–4.5)
PLATELET # BLD AUTO: 107 K/UL (ref 150–400)
PLATELET MORPHOLOGY: ABNORMAL
POTASSIUM SERPL-SCNC: 4.2 MMOL/L (ref 3.5–5.1)
RBC # BLD AUTO: 3.05 M/UL (ref 4.6–6.2)
SODIUM SERPL-SCNC: 133 MMOL/L (ref 136–145)
WBC # BLD AUTO: 4.61 K/UL (ref 4.5–11)

## 2024-08-02 PROCEDURE — 99900035 HC TECH TIME PER 15 MIN (STAT)

## 2024-08-02 PROCEDURE — 90935 HEMODIALYSIS ONE EVALUATION: CPT

## 2024-08-02 PROCEDURE — 63600175 PHARM REV CODE 636 W HCPCS: Performed by: HOSPITALIST

## 2024-08-02 PROCEDURE — 99239 HOSP IP/OBS DSCHRG MGMT >30: CPT | Mod: ,,, | Performed by: HOSPITALIST

## 2024-08-02 PROCEDURE — 94640 AIRWAY INHALATION TREATMENT: CPT

## 2024-08-02 PROCEDURE — 25000003 PHARM REV CODE 250: Performed by: HOSPITALIST

## 2024-08-02 PROCEDURE — 25000003 PHARM REV CODE 250: Performed by: INTERNAL MEDICINE

## 2024-08-02 PROCEDURE — 83735 ASSAY OF MAGNESIUM: CPT | Performed by: HOSPITALIST

## 2024-08-02 PROCEDURE — 82962 GLUCOSE BLOOD TEST: CPT

## 2024-08-02 PROCEDURE — 80069 RENAL FUNCTION PANEL: CPT | Performed by: HOSPITALIST

## 2024-08-02 PROCEDURE — 85025 COMPLETE CBC W/AUTO DIFF WBC: CPT | Performed by: HOSPITALIST

## 2024-08-02 PROCEDURE — 27000221 HC OXYGEN, UP TO 24 HOURS

## 2024-08-02 PROCEDURE — 11000001 HC ACUTE MED/SURG PRIVATE ROOM

## 2024-08-02 PROCEDURE — 25000242 PHARM REV CODE 250 ALT 637 W/ HCPCS: Performed by: HOSPITALIST

## 2024-08-02 PROCEDURE — 99233 SBSQ HOSP IP/OBS HIGH 50: CPT | Mod: GT,,, | Performed by: NURSE PRACTITIONER

## 2024-08-02 PROCEDURE — 94761 N-INVAS EAR/PLS OXIMETRY MLT: CPT

## 2024-08-02 PROCEDURE — 36415 COLL VENOUS BLD VENIPUNCTURE: CPT | Performed by: HOSPITALIST

## 2024-08-02 RX ORDER — CLOPIDOGREL BISULFATE 75 MG/1
1 TABLET ORAL DAILY
COMMUNITY
Start: 2023-11-29

## 2024-08-02 RX ORDER — SIMVASTATIN 40 MG/1
40 TABLET, FILM COATED ORAL NIGHTLY
COMMUNITY

## 2024-08-02 RX ORDER — SODIUM CHLORIDE 9 MG/ML
INJECTION, SOLUTION INTRAVENOUS
Status: DISCONTINUED | OUTPATIENT
Start: 2024-08-02 | End: 2024-08-03 | Stop reason: HOSPADM

## 2024-08-02 RX ORDER — LISINOPRIL 5 MG/1
5 TABLET ORAL DAILY
COMMUNITY

## 2024-08-02 RX ORDER — SEVELAMER CARBONATE 800 MG/1
2400 TABLET, FILM COATED ORAL
COMMUNITY

## 2024-08-02 RX ORDER — ISOSORBIDE MONONITRATE 60 MG/1
1 TABLET, EXTENDED RELEASE ORAL EVERY MORNING
COMMUNITY

## 2024-08-02 RX ADMIN — CARVEDILOL 12.5 MG: 12.5 TABLET, FILM COATED ORAL at 08:08

## 2024-08-02 RX ADMIN — SEVELAMER CARBONATE 1600 MG: 800 TABLET, FILM COATED ORAL at 05:08

## 2024-08-02 RX ADMIN — HYDRALAZINE HYDROCHLORIDE 25 MG: 25 TABLET ORAL at 10:08

## 2024-08-02 RX ADMIN — MICONAZOLE NITRATE ANTIFUNGAL POWDER: 2 POWDER TOPICAL at 09:08

## 2024-08-02 RX ADMIN — HYDRALAZINE HYDROCHLORIDE 25 MG: 25 TABLET ORAL at 01:08

## 2024-08-02 RX ADMIN — LISINOPRIL 20 MG: 20 TABLET ORAL at 08:08

## 2024-08-02 RX ADMIN — AMLODIPINE BESYLATE 10 MG: 10 TABLET ORAL at 08:08

## 2024-08-02 RX ADMIN — MICONAZOLE NITRATE ANTIFUNGAL POWDER: 2 POWDER TOPICAL at 08:08

## 2024-08-02 RX ADMIN — SEVELAMER CARBONATE 1600 MG: 800 TABLET, FILM COATED ORAL at 08:08

## 2024-08-02 RX ADMIN — HEPARIN SODIUM 5000 UNITS: 5000 INJECTION, SOLUTION INTRAVENOUS; SUBCUTANEOUS at 09:08

## 2024-08-02 RX ADMIN — IPRATROPIUM BROMIDE AND ALBUTEROL SULFATE 3 ML: 2.5; .5 SOLUTION RESPIRATORY (INHALATION) at 07:08

## 2024-08-02 RX ADMIN — IPRATROPIUM BROMIDE AND ALBUTEROL SULFATE 3 ML: 2.5; .5 SOLUTION RESPIRATORY (INHALATION) at 02:08

## 2024-08-02 RX ADMIN — SODIUM CHLORIDE: 9 INJECTION, SOLUTION INTRAVENOUS at 09:08

## 2024-08-02 RX ADMIN — CARVEDILOL 12.5 MG: 12.5 TABLET, FILM COATED ORAL at 09:08

## 2024-08-02 RX ADMIN — MUPIROCIN: 20 OINTMENT TOPICAL at 09:08

## 2024-08-02 RX ADMIN — HEPARIN SODIUM 5000 UNITS: 5000 INJECTION, SOLUTION INTRAVENOUS; SUBCUTANEOUS at 08:08

## 2024-08-02 RX ADMIN — HYDRALAZINE HYDROCHLORIDE 25 MG: 25 TABLET ORAL at 06:08

## 2024-08-02 RX ADMIN — IPRATROPIUM BROMIDE AND ALBUTEROL SULFATE 3 ML: 2.5; .5 SOLUTION RESPIRATORY (INHALATION) at 08:08

## 2024-08-02 RX ADMIN — MUPIROCIN: 20 OINTMENT TOPICAL at 08:08

## 2024-08-02 RX ADMIN — SEVELAMER CARBONATE 1600 MG: 800 TABLET, FILM COATED ORAL at 01:08

## 2024-08-03 VITALS
HEART RATE: 52 BPM | DIASTOLIC BLOOD PRESSURE: 72 MMHG | RESPIRATION RATE: 19 BRPM | BODY MASS INDEX: 31.87 KG/M2 | HEIGHT: 69 IN | SYSTOLIC BLOOD PRESSURE: 107 MMHG | OXYGEN SATURATION: 94 % | WEIGHT: 215.19 LBS | TEMPERATURE: 98 F

## 2024-08-03 LAB
ALBUMIN SERPL BCP-MCNC: 3 G/DL (ref 3.5–5)
ANION GAP SERPL CALCULATED.3IONS-SCNC: 11 MMOL/L (ref 7–16)
BUN SERPL-MCNC: 26 MG/DL (ref 7–18)
BUN/CREAT SERPL: 7 (ref 6–20)
CALCIUM SERPL-MCNC: 7.9 MG/DL (ref 8.5–10.1)
CHLORIDE SERPL-SCNC: 93 MMOL/L (ref 98–107)
CO2 SERPL-SCNC: 28 MMOL/L (ref 21–32)
CREAT SERPL-MCNC: 3.91 MG/DL (ref 0.7–1.3)
EGFR (NO RACE VARIABLE) (RUSH/TITUS): 17 ML/MIN/1.73M2
GLUCOSE SERPL-MCNC: 108 MG/DL (ref 70–105)
GLUCOSE SERPL-MCNC: 115 MG/DL (ref 74–106)
GLUCOSE SERPL-MCNC: 156 MG/DL (ref 70–105)
PHOSPHATE SERPL-MCNC: 4.3 MG/DL (ref 2.5–4.5)
POTASSIUM SERPL-SCNC: 4.3 MMOL/L (ref 3.5–5.1)
SODIUM SERPL-SCNC: 128 MMOL/L (ref 136–145)

## 2024-08-03 PROCEDURE — 25000242 PHARM REV CODE 250 ALT 637 W/ HCPCS: Performed by: HOSPITALIST

## 2024-08-03 PROCEDURE — 99900035 HC TECH TIME PER 15 MIN (STAT)

## 2024-08-03 PROCEDURE — 27000221 HC OXYGEN, UP TO 24 HOURS

## 2024-08-03 PROCEDURE — 25000003 PHARM REV CODE 250: Performed by: INTERNAL MEDICINE

## 2024-08-03 PROCEDURE — 82962 GLUCOSE BLOOD TEST: CPT

## 2024-08-03 PROCEDURE — 36415 COLL VENOUS BLD VENIPUNCTURE: CPT | Performed by: HOSPITALIST

## 2024-08-03 PROCEDURE — 94640 AIRWAY INHALATION TREATMENT: CPT

## 2024-08-03 PROCEDURE — 80069 RENAL FUNCTION PANEL: CPT | Performed by: HOSPITALIST

## 2024-08-03 PROCEDURE — 25000003 PHARM REV CODE 250: Performed by: HOSPITALIST

## 2024-08-03 PROCEDURE — 94761 N-INVAS EAR/PLS OXIMETRY MLT: CPT

## 2024-08-03 RX ADMIN — ACETAMINOPHEN 1000 MG: 500 TABLET ORAL at 01:08

## 2024-08-03 RX ADMIN — CARVEDILOL 12.5 MG: 12.5 TABLET, FILM COATED ORAL at 08:08

## 2024-08-03 RX ADMIN — LISINOPRIL 20 MG: 20 TABLET ORAL at 08:08

## 2024-08-03 RX ADMIN — ACETAMINOPHEN 1000 MG: 500 TABLET ORAL at 08:08

## 2024-08-03 RX ADMIN — IPRATROPIUM BROMIDE AND ALBUTEROL SULFATE 3 ML: 2.5; .5 SOLUTION RESPIRATORY (INHALATION) at 07:08

## 2024-08-03 RX ADMIN — SEVELAMER CARBONATE 1600 MG: 800 TABLET, FILM COATED ORAL at 08:08

## 2024-08-03 RX ADMIN — AMLODIPINE BESYLATE 10 MG: 10 TABLET ORAL at 08:08

## 2024-08-06 LAB
OHS QRS DURATION: 114 MS
OHS QTC CALCULATION: 494 MS

## 2024-11-15 PROBLEM — D63.1 ANEMIA OF RENAL DISEASE: Status: ACTIVE | Noted: 2024-01-01

## 2024-11-15 PROBLEM — I95.2 HYPOTENSION DUE TO DRUGS: Status: ACTIVE | Noted: 2024-01-01

## 2024-11-15 PROBLEM — R07.2 PRECORDIAL PAIN: Status: ACTIVE | Noted: 2024-01-01

## 2024-11-15 PROBLEM — E87.70 VOLUME OVERLOAD: Status: ACTIVE | Noted: 2024-01-01

## 2024-11-15 PROBLEM — N18.9 ANEMIA OF RENAL DISEASE: Status: ACTIVE | Noted: 2024-01-01

## 2024-11-15 PROBLEM — I21.A1 TYPE 2 MI (MYOCARDIAL INFARCTION): Status: ACTIVE | Noted: 2024-01-01

## 2024-11-15 PROBLEM — E87.1 HYPONATREMIA: Status: ACTIVE | Noted: 2024-01-01

## 2024-11-15 NOTE — ED NOTES
Chemistry stated that they are working on transferring results over. They are trying to work out the kinks with the new instruments at this time.

## 2024-11-15 NOTE — ASSESSMENT & PLAN NOTE
He has chronic angina but his BP is on the low side so limited ability to give NTG.  Troponin trend is flat, EKG is non-ischemic.  Pain is more pleuritic in nature, tachycardic, not very hypoxic but is at high risk for PE so we will check CTA chest as it can also look for dissection. He can be dialyzed tomorrow then.   Monitor on tele.

## 2024-11-15 NOTE — ED PROVIDER NOTES
Encounter Date: 11/15/2024    SCRIBE #1 NOTE: I, Yulia Chopra, am scribing for, and in the presence of,  Brian Ambriz MD.       History     Chief Complaint   Patient presents with    Chest Pain     Pt presents to the ed via ems w/ c/o AMS and chest pressure. EMS states he was last dialyzed 11/13, he was supposed to go today, but felt too bad to go      Pt is a 54 y.o. Male that presents to the ED via EMS with c/o Chest Pain. The pt reports having chest pain that  comes and goes, Pt states he has hx of Dialysis of MWF and he did not go today. Pt said the chest pain feels like pressures on the left side of his chest. Pt also states he's has chest pain like this before when he has a MI. Pt has Mhx of Bipass and DM. Pt mentioned he has SOB and a cough where yellow mucus come out. There are no other issues to report with this pt at this time.     The history is provided by the patient. No  was used.     Review of patient's allergies indicates:  No Known Allergies  Past Medical History:   Diagnosis Date    Diabetes mellitus     ESRD on hemodialysis     Hypertension      Past Surgical History:   Procedure Laterality Date    ANGIOGRAM, CORONARY, WITH LEFT HEART CATHETERIZATION N/A 8/1/2024    Procedure: Angiogram, Coronary, with Left Heart Cath;  Surgeon: Jose Elias Tineo MD;  Location: [facility] CATH LAB;  Service: Cardiology;  Laterality: N/A;     No family history on file.  Social History     Tobacco Use    Smoking status: Never    Smokeless tobacco: Never   Substance Use Topics    Alcohol use: Not Currently    Drug use: Never     Review of Systems   Constitutional:  Negative for fever.   Respiratory:  Positive for cough and shortness of breath.    Cardiovascular:  Positive for chest pain. Negative for leg swelling.   Gastrointestinal:  Negative for abdominal pain, diarrhea, nausea and vomiting.       Physical Exam     Initial Vitals [11/15/24 1105]   BP Pulse Resp Temp SpO2   (!) 99/52 69 (!) 21  98.1 °F (36.7 °C) (!) 92 %      MAP       --         Physical Exam    Nursing note and vitals reviewed.  HENT:   Head: Normocephalic and atraumatic. Mouth/Throat: Oropharynx is clear and moist.   Eyes: Pupils are equal, round, and reactive to light.   Neck: Neck supple.   Normal range of motion.  Cardiovascular:  Normal rate and regular rhythm.           Pulmonary/Chest: Effort normal and breath sounds normal.   Abdominal: Abdomen is soft. He exhibits no distension.   Musculoskeletal:         General: Normal range of motion.      Cervical back: Normal range of motion and neck supple.      Comments: Pt has BKA on right leg.      Neurological: He is alert.   Skin: Skin is warm. Capillary refill takes less than 2 seconds.   Psychiatric: He has a normal mood and affect.         ED Course   Procedures  Labs Reviewed   COMPREHENSIVE METABOLIC PANEL - Abnormal       Result Value    Sodium 129 (*)     Potassium 4.4      Chloride 90 (*)     CO2 23      Anion Gap 20 (*)     Glucose 170 (*)     BUN 63 (*)     Creatinine 6.85 (*)     BUN/Creatinine Ratio 9      Calcium 8.0 (*)     Total Protein 6.4      Albumin 2.4 (*)     Globulin 4.0      A/G Ratio 0.6      Bilirubin, Total 1.7 (*)     Alk Phos 120      ALT 14      AST 39 (*)     eGFR 9 (*)    TROPONIN I - Abnormal    Troponin I High Sensitivity 158.8 (*)    TROPONIN I - Abnormal    Troponin I High Sensitivity 142.5 (*)    NT-PRO NATRIURETIC PEPTIDE - Abnormal    ProBNP 165,975 (*)    CBC WITH DIFFERENTIAL - Abnormal    WBC 12.56 (*)     RBC 2.59 (*)     Hemoglobin 8.6 (*)     Hematocrit 26.5 (*)     .3 (*)     MCH 33.2 (*)     MCHC 32.5      RDW 14.7 (*)     Platelet Count 120 (*)     MPV 11.0      Neutrophils % 85.9 (*)     Lymphocytes % 4.3 (*)     Monocytes % 9.0 (*)     Eosinophils % 0.1 (*)     Basophils % 0.2      Immature Granulocytes % 0.5 (*)     nRBC, Auto 0.0      Neutrophils, Abs 10.79 (*)     Lymphocytes, Absolute 0.54 (*)     Monocytes, Absolute 1.13 (*)      Eosinophils, Absolute 0.01      Basophils, Absolute 0.03      Immature Granulocytes, Absolute 0.06 (*)     nRBC, Absolute 0.00      Diff Type Auto     POCT OCCULT BLOOD (STOOL) - Normal    Fecal Occult Blood Negative     CBC W/ AUTO DIFFERENTIAL    Narrative:     The following orders were created for panel order CBC auto differential.  Procedure                               Abnormality         Status                     ---------                               -----------         ------                     CBC with Differential[[id number]]       Abnormal            Final result                 Please view results for these tests on the individual orders.     EKG Readings: (Independently Interpreted)   Heart Rate: 66 bpm.   Sinus rhythm  Demand pacing  Consider left atrial abnormality  Right bundle branch block  Inferior/lateral ST-T abnormality is nonspecific       Imaging Results              X-Ray Chest AP Portable (Final result)  Result time 11/15/24 12:12:37      Final result by Khalif Garcia MD (11/15/24 12:12:37)                   Impression:      Patchy interstitial predominant opacities may reflect edema, noting that infectious or noninfectious inflammatory etiology could appear similar.    Small to moderate right pleural effusion.      Electronically signed by: Khalif Garcia  Date:    11/15/2024  Time:    12:12               Narrative:    EXAMINATION:  XR CHEST AP PORTABLE    CLINICAL HISTORY:  Chest Pain;    TECHNIQUE:  Single frontal view of the chest was performed.    COMPARISON:  Chest radiograph performed 07/30/2024, 18:21 hours.    FINDINGS:  Monitoring leads over the chest.  Status post median sternotomy, presumed for CABG.  Reported device.    Grossly unchanged cardiac contours, again noting enlargement of the cardiac silhouette and prominence of central vasculature.  Patchy interstitial predominant opacities.    Small moderately sized right pleural effusion.  No definite  pneumothorax.    No acute findings in the visualized abdomen.  Osseous and soft tissue structures appear without definite acute change.                                    X-Rays:   Independently Interpreted Readings:   Other Readings:  Details      Reading Physician Reading Date Result Priority  Khalif Garcia MD  [phone] 11/15/2024 STAT    Narrative & Impression  EXAMINATION:  XR CHEST AP PORTABLE     CLINICAL HISTORY:  Chest Pain;     TECHNIQUE:  Single frontal view of the chest was performed.     COMPARISON:  Chest radiograph performed 07/30/2024, 18:21 hours.     FINDINGS:  Monitoring leads over the chest.  Status post median sternotomy, presumed for CABG.  Reported device.     Grossly unchanged cardiac contours, again noting enlargement of the cardiac silhouette and prominence of central vasculature.  Patchy interstitial predominant opacities.     Small moderately sized right pleural effusion.  No definite pneumothorax.     No acute findings in the visualized abdomen.  Osseous and soft tissue structures appear without definite acute change.     Impression:     Patchy interstitial predominant opacities may reflect edema, noting that infectious or noninfectious inflammatory etiology could appear similar.     Small to moderate right pleural effusion.        Electronically signed by:Khalif Garcia  Date:                                            11/15/2024  Time:                                           12:12      Exam Ended: 11/15/24 11:56 CST Last Resulted: 11/15/24 12:12 CST        Medications   albuterol-ipratropium 2.5 mg-0.5 mg/3 mL nebulizer solution 3 mL (3 mLs Nebulization Given 11/15/24 1147)   methylPREDNISolone sodium succinate injection 125 mg (125 mg Intravenous Given 11/15/24 1200)     Medical Decision Making            Attending Attestation:           Physician Attestation for Scribe:  Physician Attestation Statement for Scribe #1: I, Brian Ambriz MD, reviewed documentation, as scribed  by Yulia Chopra in my presence, and it is both accurate and complete.             ED Course as of 11/15/24 1500   Fri Nov 15, 2024   1134 EKG by my interpretation shows sinus rhythm with demand pacing, no acute ST segment changes. [BB]   1139 Medical decision-making:  Differential diagnosis includes chest pain, cough, bronchitis, pneumonia, hypotension, STEMI, NSTEMI, ACS.  All testing ordered and interpreted by me. [BB]   1253 11/15/24 1215  X-Ray Chest AP Portable  Performed: 11/15/24 1156  Final         Impression: Patchy interstitial predominant opacities may reflect edema, noting that infectious or noninfectious inflammatory etiology could appear similar. Small to moderate right pleural effusion. Electronic...       [CM]   1359 CMP shows hyponatremia which appears to be chronic, chronic renal failure.  CBC shows anemia with hematocrit of 26.5.  This is worse than baseline. [BB]   1434 Repeat troponin is elevated, not significantly changed from initial troponin. [BB]   1434 Pro BNP is elevated at [id number]. [BB]   1434 Chest x-ray by my interpretation shows cardiomegaly, CHF, right pleural effusion. [BB]   1437 On repeat exam patient denies current chest pain or shortness breath. [BB]   1441 I made the decision to admit patient and discussed case with internal medicine hospitalist on-call who agrees with admission. [BB]   1500 Stool is Hemoccult negative. [BB]      ED Course User Index  [BB] Brian Ambriz MD  [CM] Yulia Chopra                           Clinical Impression:  Final diagnoses:  [R07.9] Chest pain  [R79.89] Elevated troponin (Primary)  [I95.9] Hypotension, unspecified hypotension type  [N18.9] Chronic renal failure, unspecified CKD stage          ED Disposition Condition    Observation Stable                Brian Ambriz MD  11/15/24 1500

## 2024-11-15 NOTE — Clinical Note
Diagnosis: Chest pain [048227]   Future Attending Provider: NATY ACEVEDO [[id number]]   Special Needs:: No Special Needs [1]

## 2024-11-16 PROBLEM — J90 PLEURAL EFFUSION ON RIGHT: Status: ACTIVE | Noted: 2024-01-01

## 2024-11-16 NOTE — HOSPITAL COURSE
11/16- Undergoing dialysis today, Reports chest pain has improved. Restart Imdur and Coreg as BP acceptable to help with anginal pain. CTA chest negative for PE.     11/17- CXR shows worsening pleural effusion, Pulm consulted for thoracentesis given worsening hypoxia. Cardiology consulted for ongoing anginal symptoms and medications adjusted. Confused and requiring restraints now.

## 2024-11-16 NOTE — ASSESSMENT & PLAN NOTE
Patient found to have large pleural effusion on imaging. I have personally reviewed and interpreted the following imaging: Xray and CT. A thoracentesis was deferred. Most likely etiology includes Volume overload not due to CHF. Management to include Dialysis  Consider thoracentesis later in course if no improvement in symptoms (on DAPT which cannot be held given significant CAD).

## 2024-11-16 NOTE — ASSESSMENT & PLAN NOTE
Anemia is likely due to chronic disease due to ESRD. Most recent hemoglobin and hematocrit are listed below.  Recent Labs     11/15/24  1155   HGB 8.6*   HCT 26.5*     Plan  - Monitor serial CBC: Daily  - Transfuse PRBC if patient becomes hemodynamically unstable, symptomatic or H/H drops below 7/21.  - Patient has not received any PRBC transfusions to date  - Patient's anemia is currently stable

## 2024-11-16 NOTE — ASSESSMENT & PLAN NOTE
Creatine stable for now. BMP reviewed- noted Estimated Creatinine Clearance: 14.4 mL/min (A) (based on SCr of 6.85 mg/dL (H)). according to latest data. Based on current GFR, CKD stage is end stage.    On HD M/W/F, nephrology consulted to resume sessions.  He missed session on 11/15.   Monitor UOP and serial BMP and adjust therapy as needed. Renally dose meds. Avoid nephrotoxic medications and procedures.

## 2024-11-16 NOTE — ASSESSMENT & PLAN NOTE
Anemia is likely due to chronic disease due to ESRD. Most recent hemoglobin and hematocrit are listed below.  Recent Labs     11/15/24  1155 11/16/24  0359   HGB 8.6* 9.2*   HCT 26.5* 28.1*       Plan  - Monitor serial CBC: Daily  - Transfuse PRBC if patient becomes hemodynamically unstable, symptomatic or H/H drops below 7/21.  - Patient has not received any PRBC transfusions to date  - Patient's anemia is currently stable

## 2024-11-16 NOTE — ASSESSMENT & PLAN NOTE
NT pro BNP elevated.  CXR suggestive of volume overload.    Echo    Result Date: 7/31/2024    Left Ventricle: The left ventricle is normal in size. Mildly increased   wall thickness. There is concentric hypertrophy. There is normal systolic   function with a visually estimated ejection fraction of 55 - 60%. Ejection   fraction by visual approximation is 55%. There is normal diastolic   function.    Right Ventricle: Mild right ventricular enlargement. Systolic function   is normal.    Left Atrium: Left atrium is mildly dilated.    Right Atrium: Right atrium is moderately dilated.    Aortic Valve: The aortic valve is a trileaflet valve. Mildly calcified   cusps.    Mitral Valve: There is mild bileaflet sclerosis. Mildly thickened   leaflets. There is mild regurgitation.    Tricuspid Valve: There is mild to moderate regurgitation.    IVC/SVC: Elevated venous pressure at 15 mmHg.    Pericardium: There is a trivial effusion. No indication of cardiac   tamponade.        Transesophageal echo ([facility])    Result Date: 12/19/2023  Images from the original result were not included.    Transesophageal Echocardiogram Report   Name: Eleuterio Foreman  YOB: 1970        Procedure: Transesophageal Echocardiogram    Indications: ASD  Probe Placement: Esophageal and gastric  Post Procedure Diagnosis: PFO with IAS and right to left shunt    PROCEDURE:  Informed consent was discussed including risks, benefits and   alternatives for the procedure.  Risks include, but are not limited to,   cough, sore throat, vomiting, nausea, somnolence, esophageal and stomach   trauma or perforation, bleeding, low blood pressure, aspiration,   pneumonia, infection, trauma to the teeth and death.      Time out was performed.   General sedation: general anesthesia was given by the anesthesia team,   please refer to anesthesia documentation for details.      The transesophageal probe was inserted in the esophagus and stomach and   multiple  views were obtained. The patient was kept under observation until   the patient left the procedure room.  The patient left the procedure room   in stable condition.     COMPLICATIONS:  There were no immediate complications.    FINDINGS:    LEFT VENTRICLE: Mild LV dysfunction with EF 40 to 45%  RIGHT VENTRICLE: Enlarged right ventricle with mild RV dysfunction  LEFT ATRIUM: Normal  LEFT ATRIAL APPENDAGE: No thrombus  ATRIAL SEPTUM: Interatrial septal aneurysm with clear right to left shunt   across a patent foramen ovale  RIGHT ATRIUM: Moderate to severe enlargement  AORTIC VALVE: Trileaflet with mild sclerosis no significant stenosis or   regurgitation  MITRAL VALVE: Mild mitral regurgitation  TRICUSPID VALVE: Moderate to  severe tricuspid regurgitation  PULMONIC VALVE: Mild pulmonic valve regurgitation  AORTIC ARCH, ASCENDING AND DESCENDING AORTA: Visualized portions appear   normal  PERICARDIUM: No effusion      Nephrology consulted to resume dialysis sessions.  Monitor oxygenation.

## 2024-11-16 NOTE — ASSESSMENT & PLAN NOTE
Likely underlying angina contributing but BP limiting maximizing anti-anginal therapy.   Left heart cath in 8/2024:  - Three-vessel coronary artery disease with widely patent stent of the proximal RCA.    - Widely patent LIMA to the LAD.  - Widely patent SVG to the OM branch of the left circumflex.  - Left ventricular size at the upper limit of normal with inferior wall hypokinesis and overall mildly impaired left ventricular systolic function, ejection fraction 40%.      CTA chest negative for PE.   Maximize anti-anginal therapy as able.

## 2024-11-16 NOTE — SUBJECTIVE & OBJECTIVE
Interval History: Patient seen and examined at the bedside, reports feeling better but still have chest pain.     Review of Systems   Respiratory:  Negative for shortness of breath.    Cardiovascular:  Positive for chest pain.   All other systems reviewed and are negative.    Objective:     Vital Signs (Most Recent):  Temp: 97.4 °F (36.3 °C) (11/16/24 0750)  Pulse: 68 (11/16/24 1306)  Resp: 20 (11/16/24 1306)  BP: 113/60 (11/16/24 1135)  SpO2: (!) 93 % (11/16/24 1306) Vital Signs (24h Range):  Temp:  [97.4 °F (36.3 °C)-98.6 °F (37 °C)] 97.4 °F (36.3 °C)  Pulse:  [] 68  Resp:  [13-31] 20  SpO2:  [91 %-97 %] 93 %  BP: ()/(35-70) 113/60     Weight: 99.8 kg (220 lb 0.3 oz)  Body mass index is 32.49 kg/m².    Intake/Output Summary (Last 24 hours) at 11/16/2024 1317  Last data filed at 11/16/2024 1135  Gross per 24 hour   Intake 0 ml   Output 4500 ml   Net -4500 ml         Physical Exam  Vitals and nursing note reviewed.   Constitutional:       Appearance: He is ill-appearing.   HENT:      Head: Normocephalic and atraumatic.      Nose: Nose normal.      Mouth/Throat:      Mouth: Mucous membranes are moist.   Eyes:      Extraocular Movements: Extraocular movements intact.   Cardiovascular:      Rate and Rhythm: Normal rate and regular rhythm.      Pulses: Normal pulses.      Heart sounds: Normal heart sounds.   Pulmonary:      Effort: Pulmonary effort is normal.      Breath sounds: Wheezing and rales present.   Abdominal:      General: Bowel sounds are normal.      Palpations: Abdomen is soft.   Musculoskeletal:      Cervical back: Normal range of motion.      Comments: S/p right BKA   Skin:     General: Skin is warm.      Coloration: Skin is pale.   Neurological:      General: No focal deficit present.      Mental Status: He is alert and oriented to person, place, and time. Mental status is at baseline.   Psychiatric:         Mood and Affect: Mood normal.         Behavior: Behavior normal.              Significant Labs: All pertinent labs within the past 24 hours have been reviewed.    Significant Imaging: I have reviewed all pertinent imaging results/findings within the past 24 hours.

## 2024-11-16 NOTE — NURSING
While rounding, pt noted to be hypoxic with 02 sat 71% and lethargic appearing. Nasal cannula noted under pts chin. 02 reapplied at 2L 02 sat up to 80%. 02 increased to 4L NC. 02 sat 91%. Pt confused and unable to tell nurse his current location. Pt also very lethargic as he is unable to keep his eyes open while having a conversation. BP 93/55 HR 71 R 16. Dr. Garcia notified via secure chat new orders given for STAT ABGs. RT on floor attempting to get ABG. Unsuccessful x2 RT attempts. Dr. Garcia notified of unsuccessful ABG attempt. States to place pt on BIPAP for a few hours to see if it will help with his lethargy. Pts wife at bedside. BIPAP applied per RT. Pts BP cont's to remain soft, but MAP above 60. Dr. Garcia aware and states to monitor.

## 2024-11-16 NOTE — ASSESSMENT & PLAN NOTE
Creatine stable for now. BMP reviewed- noted Estimated Creatinine Clearance: 13.3 mL/min (A) (based on SCr of 7.38 mg/dL (H)). according to latest data. Based on current GFR, CKD stage is end stage.    On HD M/W/F, nephrology consulted to resume sessions.  He missed session on 11/15.   Monitor UOP and serial BMP and adjust therapy as needed.   Renally dose meds. Avoid nephrotoxic medications and procedures.

## 2024-11-16 NOTE — SUBJECTIVE & OBJECTIVE
Past Medical History:   Diagnosis Date    Diabetes mellitus     ESRD on hemodialysis     Hypertension        Past Surgical History:   Procedure Laterality Date    ANGIOGRAM, CORONARY, WITH LEFT HEART CATHETERIZATION N/A 8/1/2024    Procedure: Angiogram, Coronary, with Left Heart Cath;  Surgeon: Jose Elias Tineo MD;  Location: [facility] CATH LAB;  Service: Cardiology;  Laterality: N/A;       Review of patient's allergies indicates:  No Known Allergies  Current Facility-Administered Medications   Medication Frequency    0.9% NaCl infusion     acetaminophen tablet 650 mg Q4H PRN    albuterol-ipratropium 2.5 mg-0.5 mg/3 mL nebulizer solution 3 mL Q6H    aluminum-magnesium hydroxide-simethicone 200-200-20 mg/5 mL suspension 30 mL QID PRN    aspirin EC tablet 81 mg QAM    atorvastatin tablet 20 mg QHS    calcium acetate(phosphat bind) capsule 1,334 mg TID    carvediloL tablet 6.25 mg BID    clopidogreL tablet 75 mg Daily    heparin (porcine) injection 5,000 Units Q8H    HYDROcodone-acetaminophen 5-325 mg per tablet 1 tablet Q6H PRN    isosorbide mononitrate 24 hr tablet 60 mg QAM    latanoprost 0.005 % ophthalmic solution 1 drop QHS    naloxone 0.4 mg/mL injection 0.02 mg PRN    nitroGLYCERIN SL tablet 0.4 mg Q5 Min PRN    ondansetron injection 4 mg Q8H PRN    sodium chloride 0.9% flush 10 mL Q12H PRN     Family History    None       Tobacco Use    Smoking status: Never    Smokeless tobacco: Never   Substance and Sexual Activity    Alcohol use: Not Currently    Drug use: Never    Sexual activity: Not Currently     Review of Systems   Constitutional:  Negative for fever.   HENT:  Negative for sore throat.    Respiratory:  Positive for shortness of breath.    Cardiovascular:  Positive for chest pain.   Gastrointestinal:  Negative for vomiting.   Genitourinary:  Negative for dysuria.   Musculoskeletal:  Negative for arthralgias.   Skin:  Negative for rash.   Neurological:  Negative for seizures.   Hematological:  Negative  for adenopathy.     Objective:     Vital Signs (Most Recent):  Temp: 97.4 °F (36.3 °C) (11/16/24 0750)  Pulse: 68 (11/16/24 1306)  Resp: 20 (11/16/24 1306)  BP: 113/60 (11/16/24 1135)  SpO2: (!) 93 % (11/16/24 1306) Vital Signs (24h Range):  Temp:  [97.4 °F (36.3 °C)-98.6 °F (37 °C)] 97.4 °F (36.3 °C)  Pulse:  [] 68  Resp:  [13-31] 20  SpO2:  [91 %-97 %] 93 %  BP: ()/(35-70) 113/60     Weight: 99.8 kg (220 lb 0.3 oz) (11/15/24 1730)  Body mass index is 32.49 kg/m².  Body surface area is 2.2 meters squared.    No intake/output data recorded.     Physical Exam  Vitals reviewed.   Constitutional:       General: He is not in acute distress.     Appearance: He is ill-appearing. He is not toxic-appearing.   HENT:      Head: Normocephalic and atraumatic.      Nose: Nose normal.      Mouth/Throat:      Mouth: Mucous membranes are moist.      Pharynx: Oropharynx is clear.   Eyes:      General: No scleral icterus.     Conjunctiva/sclera: Conjunctivae normal.      Pupils: Pupils are equal, round, and reactive to light.   Cardiovascular:      Rate and Rhythm: Normal rate and regular rhythm.   Pulmonary:      Effort: No respiratory distress.      Breath sounds: Normal breath sounds.   Abdominal:      General: Bowel sounds are normal.      Palpations: Abdomen is soft. There is no mass.   Musculoskeletal:         General: No swelling or tenderness.      Cervical back: Normal range of motion and neck supple.   Lymphadenopathy:      Cervical: No cervical adenopathy.   Skin:     General: Skin is warm and dry.      Findings: No erythema.   Neurological:      General: No focal deficit present.      Mental Status: Mental status is at baseline.      Comments: Patient answered questions but he was speaking in a soft her tone unusual laying flat in the dialysis chair.   Psychiatric:         Mood and Affect: Mood normal.         Behavior: Behavior normal.          Significant Labs:  All labs within the past 24 hours have been  reviewed.    Significant Imaging:

## 2024-11-16 NOTE — DIALYSIS ROUNDING
Patient is seen on hemodialysis, they are tolerating this well, we will continue their treatment unchanged.    Diagnosis end-stage renal disease

## 2024-11-16 NOTE — ASSESSMENT & PLAN NOTE
CTA chest to evaluate for PE.  Likely underlying angina contributing but BP limiting maximizing anti-anginal therapy.

## 2024-11-16 NOTE — ASSESSMENT & PLAN NOTE
Patient has history of coronary artery disease, he presents with complaints of chest pain, his troponin I's slightly elevated.

## 2024-11-16 NOTE — PLAN OF CARE
Problem: Adult Inpatient Plan of Care  Goal: Plan of Care Review  Outcome: Progressing  Goal: Patient-Specific Goal (Individualized)  Outcome: Progressing  Goal: Absence of Hospital-Acquired Illness or Injury  Outcome: Progressing  Goal: Optimal Comfort and Wellbeing  Outcome: Progressing  Goal: Readiness for Transition of Care  Outcome: Progressing     Problem: Diabetes Comorbidity  Goal: Blood Glucose Level Within Targeted Range  Outcome: Progressing     Problem: Skin Injury Risk Increased  Goal: Skin Health and Integrity  Outcome: Progressing     Problem: Gas Exchange Impaired  Goal: Optimal Gas Exchange  Outcome: Progressing     Problem: Hemodialysis  Goal: Safe, Effective Therapy Delivery  Outcome: Progressing  Goal: Effective Tissue Perfusion  Outcome: Progressing  Goal: Absence of Infection Signs and Symptoms  Outcome: Progressing

## 2024-11-16 NOTE — CONSULTS
[facility] - [address] [place], [place] [address]  Nephrology  Consult Note    Patient Name: Eleuterio Foreman  MRN: [id number]  Admission Date: 11/15/2024  Hospital Length of Stay: 0 days  Attending Provider: Zeferino Garcia MD   Primary Care Physician: [facility]  Principal Problem:Volume overload    Consults  Subjective:     HPI: Chief complaint is chest pain    History of present illness:  Mr. Foreman is a 54-year-old  gentleman who dialyzes on a Monday Wednesday Friday basis in [facility].  The patient's last dialysis was this past Wednesday.  The patient awoke yesterday morning complaining of chest pain he got confused as to what the day was and did not go to dialysis yesterday.  The patient states he was in his normal state of health prior to waking up with chest pain yesterday.  He has had some associated shortness of breath.  The patient's blood pressure was noted to be little bit low on admission with a systolic of around 96.  The patient has a known history of coronary artery disease with stent placement.  He had an angiogram done within the past few months that showed these to be open.  The patient's CT scan showed a large pleural effusion on the right side of his lower chest area.  So has an associated anemia with a hematocrit of 28%.    Past Medical History:   Diagnosis Date    Diabetes mellitus     ESRD on hemodialysis     Hypertension        Past Surgical History:   Procedure Laterality Date    ANGIOGRAM, CORONARY, WITH LEFT HEART CATHETERIZATION N/A 8/1/2024    Procedure: Angiogram, Coronary, with Left Heart Cath;  Surgeon: Jose Elias Tineo MD;  Location: [facility] CATH LAB;  Service: Cardiology;  Laterality: N/A;       Review of patient's allergies indicates:  No Known Allergies  Current Facility-Administered Medications   Medication Frequency    0.9% NaCl infusion     acetaminophen tablet 650 mg Q4H PRN    albuterol-ipratropium 2.5 mg-0.5 mg/3 mL nebulizer solution 3  mL Q6H    aluminum-magnesium hydroxide-simethicone 200-200-20 mg/5 mL suspension 30 mL QID PRN    aspirin EC tablet 81 mg QAM    atorvastatin tablet 20 mg QHS    calcium acetate(phosphat bind) capsule 1,334 mg TID    carvediloL tablet 6.25 mg BID    clopidogreL tablet 75 mg Daily    heparin (porcine) injection 5,000 Units Q8H    HYDROcodone-acetaminophen 5-325 mg per tablet 1 tablet Q6H PRN    isosorbide mononitrate 24 hr tablet 60 mg QAM    latanoprost 0.005 % ophthalmic solution 1 drop QHS    naloxone 0.4 mg/mL injection 0.02 mg PRN    nitroGLYCERIN SL tablet 0.4 mg Q5 Min PRN    ondansetron injection 4 mg Q8H PRN    sodium chloride 0.9% flush 10 mL Q12H PRN     Family History    None       Tobacco Use    Smoking status: Never    Smokeless tobacco: Never   Substance and Sexual Activity    Alcohol use: Not Currently    Drug use: Never    Sexual activity: Not Currently     Review of Systems   Constitutional:  Negative for fever.   HENT:  Negative for sore throat.    Respiratory:  Positive for shortness of breath.    Cardiovascular:  Positive for chest pain.   Gastrointestinal:  Negative for vomiting.   Genitourinary:  Negative for dysuria.   Musculoskeletal:  Negative for arthralgias.   Skin:  Negative for rash.   Neurological:  Negative for seizures.   Hematological:  Negative for adenopathy.     Objective:     Vital Signs (Most Recent):  Temp: 97.4 °F (36.3 °C) (11/16/24 0750)  Pulse: 68 (11/16/24 1306)  Resp: 20 (11/16/24 1306)  BP: 113/60 (11/16/24 1135)  SpO2: (!) 93 % (11/16/24 1306) Vital Signs (24h Range):  Temp:  [97.4 °F (36.3 °C)-98.6 °F (37 °C)] 97.4 °F (36.3 °C)  Pulse:  [] 68  Resp:  [13-31] 20  SpO2:  [91 %-97 %] 93 %  BP: ()/(35-70) 113/60     Weight: 99.8 kg (220 lb 0.3 oz) (11/15/24 1730)  Body mass index is 32.49 kg/m².  Body surface area is 2.2 meters squared.    No intake/output data recorded.     Physical Exam  Vitals reviewed.   Constitutional:       General: He is not in acute  distress.     Appearance: He is ill-appearing. He is not toxic-appearing.   HENT:      Head: Normocephalic and atraumatic.      Nose: Nose normal.      Mouth/Throat:      Mouth: Mucous membranes are moist.      Pharynx: Oropharynx is clear.   Eyes:      General: No scleral icterus.     Conjunctiva/sclera: Conjunctivae normal.      Pupils: Pupils are equal, round, and reactive to light.   Cardiovascular:      Rate and Rhythm: Normal rate and regular rhythm.   Pulmonary:      Effort: No respiratory distress.      Breath sounds: Normal breath sounds.   Abdominal:      General: Bowel sounds are normal.      Palpations: Abdomen is soft. There is no mass.   Musculoskeletal:         General: No swelling or tenderness.      Cervical back: Normal range of motion and neck supple.   Lymphadenopathy:      Cervical: No cervical adenopathy.   Skin:     General: Skin is warm and dry.      Findings: No erythema.   Neurological:      General: No focal deficit present.      Mental Status: Mental status is at baseline.      Comments: Patient answered questions but he was speaking in a soft her tone unusual laying flat in the dialysis chair.   Psychiatric:         Mood and Affect: Mood normal.         Behavior: Behavior normal.          Significant Labs:  All labs within the past 24 hours have been reviewed.    Significant Imaging:    Assessment/Plan:     Pulmonary  Pleural effusion on right  Consider thoracentesis    Cardiac/Vascular  CAD (coronary artery disease)  Patient has history of coronary artery disease, he presents with complaints of chest pain, his troponin I's slightly elevated.    Renal/  ESRD (end stage renal disease)  Continue HD support while here    Oncology  Anemia of renal disease  Patient's hematocrit is 28%, monitor    Endocrine  * Volume overload  Patient is to get 4 L of fluid removed today on dialysis    Obesity (BMI 30-39.9)  Patient's BMI is 32        Thank you for your consult. I will follow-up with  patient. Please contact us if you have any additional questions.    Prosper Aviles MD  Nephrology  [facility] - [address] [place] [address]

## 2024-11-16 NOTE — HPI
Mr. Foreman is a 54 Y O male with PMH of CAD s/p CABG, PVD s/p right BKA, DM type II, HTN who presented to ED with chief complaint of chest pain and shortness of breath that started at 7AM. Patient stated that he woke up around 5AM feeling alright/at baseline. He was supposed to go to dialysis at 5AM but he got confused if it was his day today or not. Around 7AM he noted sharp central chest pain, non-radiating and associated with shortness of breath. No nausea, vomiting, cough, fever reported. He was out of regular S/L NTG, he denies any alleviating or aggravating factors. He has known CAD and reports this pain being similar to one that he had when he had his heart attack. He does have chronic angina as well. No leg swelling reported.

## 2024-11-16 NOTE — ASSESSMENT & PLAN NOTE
Hyponatremia is likely due to renal insufficiency. The patient's most recent sodium results are listed below.  Recent Labs     11/15/24  1155 11/16/24  0359   * 126*       Plan  - Correct the sodium by 4-6mEq in 24 hours.   - Obtain the following studies: none.  - Will treat the hyponatremia with Hemodialysis  - Monitor sodium Daily.   - Patient hyponatremia is worsening, continue volume removal with dialysis.

## 2024-11-16 NOTE — NURSING
[facility] [place] [facility]  DIALYSIS TREATMENT SUMMARY      Note: Consult with the attending physician for patient treatment orders, this document is not a physician order.      Patient Information   Patient Eleuterio Foreman   Date of Birth January 07, 1970   Chart Number [id number]   Location [facility]   Location MRN [id number]   Gender Male   SSN (last 4) [id number]     Treatment Information   Treatment Type Hemodialysis   Treatment Id [id number]   Start Time November 16, 2024 08:30   End Time November 16, 2024 11:30   Acutal Duration 03:00     Treatment Balances   Total Saline Administered 500   Net Fluid Balance 4000    Hemodialysis Orders   Therapy Standard   Orders Verified Time 11/16/2024 07:00    Date Verified 11/16/2024   Duration 3:00   Isolated UF/Bypass No   BFR (mL) 400   DFR X1.5 BFR   Dialyzer Type OPTIFLUX 160NR   UF Order UF Range   UF Range (mL) 1000 - 4000   With BP Parameters Yes   As Tolerated Yes   Crit-Line used No   Heparin Initial Units Bolus No   Heparin IV Maintenance Bolus No   Heparin IV Infusion No   Potassium (mEq/L) 3   Calcium (mEq/L) 2.5   Bicarb (mg/dL) 33   Sodium (mEq/L) 137   Additional Orders Turn off uf if pt becomes symptomatic associated with a drop in sbp of >20mmHg, adjust uf to keep sbp >90mmHg   Clinician Lito Mansfield, MEET    Dialysis Access   Access Type AV Access   AV Access   Access Location Upper Arm - L   Needle 15g   Bruit Yes   Thrill Yes      Vitals   Pre-Treatment Vitals   Time Is BP being recorded? Pre BP Map BP Method Pulse RR Temp How was Weight Obtained? Pre Weight Previous Dry Weight Previous Post Weight Metric Target Fluid Removal (mL) Dialysate Confirmed Clinician   11/16/2024 08:25 BP/Map 138/70 (93) Noninvasive 68 18 97.5 Unable to Obtain: Floor to Weigh     4500 Yes Lito Mansfield, MEET   Comments:       Post Treatment Vitals   Time Is BP being recorded? BP Map Pulse RR Temp How was Weight Obtained? Post Weight Metric BVP UF Goal Ordered NSS Given  Intra-Procedure Total Machine UF Removed (mL) Crit-Line Ending Profile Crit-Line Refill Crit-Line Ending HCT Crit-Line Max BV% Clinician   11/16/2024 11:35 BP/Map 113/60 (78) 70 18 97.5 Unable to Obtain: Floor to Weigh   68.6  0 4500     Lito Mansfield RN   Comments:       Safety checks include: access uncovered and secured, Hemaclip secured for all central line access, machine checks performed, and alarm limits confirmed.     Labs   Hepatitis   HBsAG Lab Result HBsAG Lab Result HBsAG Draw Date Transient Antigenemia(MD Diagnosis Only) Anti-HBs Lab Result Anti-HBs Lab Value Anti-HBs Draw Date Documented By Documented Date Hepatitis Status Hepatitis Status   Negative  10/14/2024  Positive  10/14/2024 Lito Mansfield 11/16/2024  Immune   Notes:       Pre-Treatment Hepatitis Precautions Hepatitis Information Entered By Lito Mansfield RN Signed By Lito Mansfield RN   Copy of hepatitis results verified in hospital EMR Yes Signing      Pre-Treatment Handoff   Staff Report Received Yes   Report Given by Primary Nurse Kailee Palma   Time 08:00     Patient Arrival   Patient ID Verified Date of Birth    Full Name   Patient Consent to treatment verified Yes   Blood Transfusion Consent Verified N/A     Treatment Comments   Treatment Notes Treatment for 3 hrs, tolerated well, hemodynamically stable, hemostasis achieved to left av fistula, dressing applied over needle sticks     Post-Treatment Handoff   Report Given to Primary Nurse Kailee Palma   Time Report Given 11:45   Report Given By Lito Mansfield RN    Machine Validation   Time 08:00   Date 11/16/2024   Auto Alarm Test Passed Yes   Machine Serial # [id number]   Portable RO Yes   RO Serial# [id number]   Residual Bleach Negative Yes   Was a manufactured mix used? No   Machine Log Completed Yes   Total Chlorine (less than 0.1)? Yes   Total Chlorine Log Completed Yes   Bicarb BiBag   Bibag Size 650   Machine Temp 37   Machine Conductivity 14   Meter Type N/A    Meter Conductivity    Independent Conductivity 13.9   pH Status Pass Pass   pH    TCD Value 13.7   TCD Alarm with +/- 0.5 Yes   NVL enabled validated 100 asymmetric Yes   Safety check complete Lito Mansfield RN   Second Verification Performed? No   Second Verification Performed By    Reason Not Verified No other staff members located at the hospital      Serum Lab Values   Time BUN Creatinine Na K (mEq/L) Cl CO2 Ca (mEq/L) Phos Mg (mg/dL) Alb (g/dL) Glucose Hgb Hct WBC Plt PT aPTT INR Other Clinician   11/16/2024 03:59 77 7.38 126 4.6 90 20 7.9     9.2 28.1 12.91      Lito Mansfield RN   Comments:         Facility Information Location Dialysis Suite Multi Diagnosis Chest pain, SOB   Facility Information Stat Treatment No Isolation Information   Admission Date 11/15/2024 Patient Type Chronic dialysis patient with diagnosis of ESRD Isolation Required? N   Ordering MD Aviles Patient Chronic Unit [facility] Completed by   Account/Finance Number [id number] Code Status Full Code General Tx information Entered by Lito Mansfield RN   Admission Status InPatient Diagnosis      Start Treatment Time Out Confirmed by Lito Mansfield Correct access site verified Yes   Treatment Initiation Connections Secured Time Out Completed 08:28 Treatment Start Date 11/16/2024    Saline line double clamped Correct patient verified Yes Treatment Start Time 08:30    N/A Correct procedure verified Yes Patient/Family questions and concerns addressed Yes     Pre Focused Assessment Edema LOC Alert and Oriented x3   Access Location Generalized GI / Bowels   Signs and Symptoms of Infection? No Edema Grade 2+ GI Soft   Pain Screening Cardiac    Does the patient have pain? No Heart Rhythm Regular  Anuric   Respiratory Telemetry No Completed by   Lung Sounds Diminished Skin Pre Treatment Focused Assessment Completed By Lito Mansfield RN   Location Bilateral Skin Warm Time 08:26   Position Bases  Dry Signing   Respiratory Efforts  Unlabored LOC Signed By Lito Mansfield RN     Education  Treatment Options Patient Education Reinforced By   Patient Education Method Knowledge / Understanding Assessed Demonstration Patient Education Reinforced by Lito Mansfield RN   Patient Educated? Yes Family Education Provided? N/A    Focus or Topic Infection Prevention Caregiver Education Provided? N/A      Post-Treatment HD machine external disinfection completed per policy Yes Completed by   Post Treatment Delay PRO external disinfection completed per policy Yes Post Treatment Form Completed By Lito Mansfield RN   Delay N Post Treatment General Information    Machine Disinfection Requirement Notes Net uf, vss, hemodynamically stable    Post Focused Assessment Location Bilateral LOC   Changes from Pre Focused Assessment Position Bases LOC Alert and Oriented x3   Changes from Pre Treatment Focused Assessment? No Respiratory Efforts Unlabored GI / Bowels   Access Edema GI Soft   Bruit Yes Location Generalized    Thrill Yes Edema Grade 2+  Anuric   Access Flow Good Cardiac Completed by   Dialyzer Clearance Streaked Heart Rhythm Regular Post Treatment Focused Assessment Completed by Lito Mansfield RN   Pain Screening Telemetry No Date 11/16/2024   Does the patient have pain? No Skin Time 11:40   Respiratory Skin Warm Signing   Lung Sounds Diminished  Dry Signed By Lito Mansfield RN

## 2024-11-16 NOTE — ASSESSMENT & PLAN NOTE
NT pro BNP elevated.  CXR suggestive of volume overload.  CT with large right sided pleural effusion.     Echo    Result Date: 7/31/2024    Left Ventricle: The left ventricle is normal in size. Mildly increased   wall thickness. There is concentric hypertrophy. There is normal systolic   function with a visually estimated ejection fraction of 55 - 60%. Ejection   fraction by visual approximation is 55%. There is normal diastolic   function.    Right Ventricle: Mild right ventricular enlargement. Systolic function   is normal.    Left Atrium: Left atrium is mildly dilated.    Right Atrium: Right atrium is moderately dilated.    Aortic Valve: The aortic valve is a trileaflet valve. Mildly calcified   cusps.    Mitral Valve: There is mild bileaflet sclerosis. Mildly thickened   leaflets. There is mild regurgitation.    Tricuspid Valve: There is mild to moderate regurgitation.    IVC/SVC: Elevated venous pressure at 15 mmHg.    Pericardium: There is a trivial effusion. No indication of cardiac   tamponade.        Transesophageal echo (JEANCARLOS)    Result Date: 12/19/2023  Images from the original result were not included.    Transesophageal Echocardiogram Report   Name: Eleuterio Foreman  YOB: 1970        Procedure: Transesophageal Echocardiogram    Indications: ASD  Probe Placement: Esophageal and gastric  Post Procedure Diagnosis: PFO with IAS and right to left shunt    PROCEDURE:  Informed consent was discussed including risks, benefits and   alternatives for the procedure.  Risks include, but are not limited to,   cough, sore throat, vomiting, nausea, somnolence, esophageal and stomach   trauma or perforation, bleeding, low blood pressure, aspiration,   pneumonia, infection, trauma to the teeth and death.      Time out was performed.   General sedation: general anesthesia was given by the anesthesia team,   please refer to anesthesia documentation for details.      The transesophageal probe was inserted in  the esophagus and stomach and   multiple views were obtained. The patient was kept under observation until   the patient left the procedure room.  The patient left the procedure room   in stable condition.     COMPLICATIONS:  There were no immediate complications.    FINDINGS:    LEFT VENTRICLE: Mild LV dysfunction with EF 40 to 45%  RIGHT VENTRICLE: Enlarged right ventricle with mild RV dysfunction  LEFT ATRIUM: Normal  LEFT ATRIAL APPENDAGE: No thrombus  ATRIAL SEPTUM: Interatrial septal aneurysm with clear right to left shunt   across a patent foramen ovale  RIGHT ATRIUM: Moderate to severe enlargement  AORTIC VALVE: Trileaflet with mild sclerosis no significant stenosis or   regurgitation  MITRAL VALVE: Mild mitral regurgitation  TRICUSPID VALVE: Moderate to  severe tricuspid regurgitation  PULMONIC VALVE: Mild pulmonic valve regurgitation  AORTIC ARCH, ASCENDING AND DESCENDING AORTA: Visualized portions appear   normal  PERICARDIUM: No effusion      Nephrology consulted to resume dialysis sessions.  Monitor oxygenation.

## 2024-11-16 NOTE — H&P
[facility] - [address] [place] [address] Medicine  History & Physical    Patient Name: Eleuterio Foreman  MRN: [id number]  Patient Class: OP- Observation  Admission Date: 11/15/2024  Attending Physician: Zeferino Garcia MD   Primary Care Provider: [facility]         Patient information was obtained from patient, past medical records, and ER records.     Subjective:     Principal Problem:Volume overload    Chief Complaint:   Chief Complaint   Patient presents with    Chest Pain     Pt presents to the ed via ems w/ c/o AMS and chest pressure. EMS states he was last dialyzed 11/13, he was supposed to go today, but felt too bad to go         HPI: Mr. Foreman is a 54 Y O male with PMH of CAD s/p CABG, PVD s/p right BKA, DM type II, HTN who presented to ED with chief complaint of chest pain and shortness of breath that started at 7AM. Patient stated that he woke up around 5AM feeling alright/at baseline. He was supposed to go to dialysis at 5AM but he got confused if it was his day today or not. Around 7AM he noted sharp central chest pain, non-radiating and associated with shortness of breath. No nausea, vomiting, cough, fever reported. He was out of regular S/L NTG, he denies any alleviating or aggravating factors. He has known CAD and reports this pain being similar to one that he had when he had his heart attack. He does have chronic angina as well. No leg swelling reported.     Past Medical History:   Diagnosis Date    Diabetes mellitus     ESRD on hemodialysis     Hypertension        Past Surgical History:   Procedure Laterality Date    ANGIOGRAM, CORONARY, WITH LEFT HEART CATHETERIZATION N/A 8/1/2024    Procedure: Angiogram, Coronary, with Left Heart Cath;  Surgeon: Jose Elias Tineo MD;  Location: [facility] CATH LAB;  Service: Cardiology;  Laterality: N/A;       Review of patient's allergies indicates:  No Known Allergies    No current facility-administered medications on file prior to  encounter.     Current Outpatient Medications on File Prior to Encounter   Medication Sig    amLODIPine (NORVASC) 2.5 MG tablet Take 2.5 mg by mouth once daily.    aspirin (ECOTRIN) 81 MG EC tablet Take 1 tablet by mouth every morning.    calcium acetate,phosphat bind, (PHOSLO) 667 mg capsule Take 1,334 mg by mouth 3 (three) times daily.    carvediloL (COREG) 12.5 MG tablet Take 12.5 mg by mouth 2 (two) times daily.    clopidogreL (PLAVIX) 75 mg tablet Take 1 tablet by mouth once daily.    isosorbide mononitrate (IMDUR) 60 MG 24 hr tablet Take 1 tablet by mouth every morning.    latanoprost 0.005 % ophthalmic solution Place 1 drop into both eyes every evening.    lisinopriL 10 MG tablet Take 10 mg by mouth 2 (two) times daily.    simvastatin (ZOCOR) 40 MG tablet Take 40 mg by mouth every evening.    [DISCONTINUED] lisinopriL (PRINIVIL,ZESTRIL) 5 MG tablet Take 5 mg by mouth once daily.    [DISCONTINUED] sevelamer carbonate (RENVELA) 800 mg Tab Take 2,400 mg by mouth 3 (three) times daily with meals.     Family History    None       Tobacco Use    Smoking status: Never    Smokeless tobacco: Never   Substance and Sexual Activity    Alcohol use: Not Currently    Drug use: Never    Sexual activity: Not Currently     Review of Systems   Respiratory:  Positive for chest tightness and shortness of breath.    All other systems reviewed and are negative.    Objective:     Vital Signs (Most Recent):  Temp: 98 °F (36.7 °C) (11/15/24 1730)  Pulse: 63 (11/15/24 1730)  Resp: 15 (11/15/24 1730)  BP: (!) 104/36 (11/15/24 1800)  SpO2: (!) 94 % (11/15/24 1730) Vital Signs (24h Range):  Temp:  [98 °F (36.7 °C)-98.1 °F (36.7 °C)] 98 °F (36.7 °C)  Pulse:  [61-69] 63  Resp:  [12-31] 15  SpO2:  [91 %-100 %] 94 %  BP: ()/(35-55) 104/36     Weight: 99.8 kg (220 lb 0.3 oz)  Body mass index is 32.49 kg/m².     Physical Exam  Vitals and nursing note reviewed.   Constitutional:       Appearance: He is ill-appearing.   HENT:      Head:  Normocephalic and atraumatic.      Nose: Nose normal.      Mouth/Throat:      Mouth: Mucous membranes are moist.   Eyes:      Extraocular Movements: Extraocular movements intact.   Cardiovascular:      Rate and Rhythm: Normal rate and regular rhythm.      Pulses: Normal pulses.      Heart sounds: Normal heart sounds.   Pulmonary:      Effort: Pulmonary effort is normal.      Breath sounds: Wheezing present.   Abdominal:      General: Bowel sounds are normal.      Palpations: Abdomen is soft.   Musculoskeletal:      Cervical back: Normal range of motion.      Comments: S/p right BKA   Skin:     General: Skin is warm.      Coloration: Skin is pale.   Neurological:      General: No focal deficit present.      Mental Status: He is alert and oriented to person, place, and time. Mental status is at baseline.   Psychiatric:         Mood and Affect: Mood normal.         Behavior: Behavior normal.                Significant Labs: All pertinent labs within the past 24 hours have been reviewed.    Significant Imaging: I have reviewed all pertinent imaging results/findings within the past 24 hours.  Assessment/Plan:     * Volume overload  NT pro BNP elevated.  CXR suggestive of volume overload.    Echo    Result Date: 7/31/2024    Left Ventricle: The left ventricle is normal in size. Mildly increased   wall thickness. There is concentric hypertrophy. There is normal systolic   function with a visually estimated ejection fraction of 55 - 60%. Ejection   fraction by visual approximation is 55%. There is normal diastolic   function.    Right Ventricle: Mild right ventricular enlargement. Systolic function   is normal.    Left Atrium: Left atrium is mildly dilated.    Right Atrium: Right atrium is moderately dilated.    Aortic Valve: The aortic valve is a trileaflet valve. Mildly calcified   cusps.    Mitral Valve: There is mild bileaflet sclerosis. Mildly thickened   leaflets. There is mild regurgitation.    Tricuspid Valve: There  is mild to moderate regurgitation.    IVC/SVC: Elevated venous pressure at 15 mmHg.    Pericardium: There is a trivial effusion. No indication of cardiac   tamponade.        Transesophageal echo (JEANCARLOS)    Result Date: 12/19/2023  Images from the original result were not included.    Transesophageal Echocardiogram Report   Name: Eleuterio Foreman  YOB: 1970        Procedure: Transesophageal Echocardiogram    Indications: ASD  Probe Placement: Esophageal and gastric  Post Procedure Diagnosis: PFO with IAS and right to left shunt    PROCEDURE:  Informed consent was discussed including risks, benefits and   alternatives for the procedure.  Risks include, but are not limited to,   cough, sore throat, vomiting, nausea, somnolence, esophageal and stomach   trauma or perforation, bleeding, low blood pressure, aspiration,   pneumonia, infection, trauma to the teeth and death.      Time out was performed.   General sedation: general anesthesia was given by the anesthesia team,   please refer to anesthesia documentation for details.      The transesophageal probe was inserted in the esophagus and stomach and   multiple views were obtained. The patient was kept under observation until   the patient left the procedure room.  The patient left the procedure room   in stable condition.     COMPLICATIONS:  There were no immediate complications.    FINDINGS:    LEFT VENTRICLE: Mild LV dysfunction with EF 40 to 45%  RIGHT VENTRICLE: Enlarged right ventricle with mild RV dysfunction  LEFT ATRIUM: Normal  LEFT ATRIAL APPENDAGE: No thrombus  ATRIAL SEPTUM: Interatrial septal aneurysm with clear right to left shunt   across a patent foramen ovale  RIGHT ATRIUM: Moderate to severe enlargement  AORTIC VALVE: Trileaflet with mild sclerosis no significant stenosis or   regurgitation  MITRAL VALVE: Mild mitral regurgitation  TRICUSPID VALVE: Moderate to  severe tricuspid regurgitation  PULMONIC VALVE: Mild pulmonic valve  regurgitation  AORTIC ARCH, ASCENDING AND DESCENDING AORTA: Visualized portions appear   normal  PERICARDIUM: No effusion      Nephrology consulted to resume dialysis sessions.  Monitor oxygenation.       Precordial pain  Likely underlying angina contributing but BP limiting maximizing anti-anginal therapy.   Left heart cath in 8/2024:  - Three-vessel coronary artery disease with widely patent stent of the proximal RCA.    - Widely patent LIMA to the LAD.  - Widely patent SVG to the OM branch of the left circumflex.  - Left ventricular size at the upper limit of normal with inferior wall hypokinesis and overall mildly impaired left ventricular systolic function, ejection fraction 40%.      CTA chest to evaluate for PE.  Maximize anti-anginal therapy as able.           Hypotension due to drugs  Lactate WNL.  Hold home Coreg, lisinopril and Imdur unless MAP is steadily > 70.       Type 2 MI (myocardial infarction)  He has chronic angina but his BP is on the low side so limited ability to give NTG.  Troponin trend is flat, EKG is non-ischemic.  Pain is more pleuritic in nature, tachycardic, not very hypoxic but is at high risk for PE so we will check CTA chest as it can also look for dissection. He can be dialyzed tomorrow then.   Monitor on tele.       Hyponatremia  Hyponatremia is likely due to renal insufficiency. The patient's most recent sodium results are listed below.  Recent Labs     11/15/24  1155   *     Plan  - Correct the sodium by 4-6mEq in 24 hours.   - Obtain the following studies: none.  - Will treat the hyponatremia with Hemodialysis  - Monitor sodium Daily.   - Patient hyponatremia is stable    Anemia of renal disease  Anemia is likely due to chronic disease due to ESRD. Most recent hemoglobin and hematocrit are listed below.  Recent Labs     11/15/24  1155   HGB 8.6*   HCT 26.5*     Plan  - Monitor serial CBC: Daily  - Transfuse PRBC if patient becomes hemodynamically unstable, symptomatic or  H/H drops below 7/21.  - Patient has not received any PRBC transfusions to date  - Patient's anemia is currently stable    Obesity (BMI 30-39.9)  Body mass index is 32.49 kg/m². Morbid obesity complicates all aspects of disease management from diagnostic modalities to treatment. Weight loss encouraged and health benefits explained to patient.         Asthma  Not in exacerbation.  Duo nebs.      CAD (coronary artery disease)  Patient with known CAD s/p stent placement and CABG, which is controlled Will continue ASA, Plavix, and Statin and monitor for S/Sx of angina/ACS. Continue to monitor on telemetry.     ESRD (end stage renal disease)  Creatine stable for now. BMP reviewed- noted Estimated Creatinine Clearance: 14.4 mL/min (A) (based on SCr of 6.85 mg/dL (H)). according to latest data. Based on current GFR, CKD stage is end stage.    On HD M/W/F, nephrology consulted to resume sessions.  He missed session on 11/15.   Monitor UOP and serial BMP and adjust therapy as needed. Renally dose meds. Avoid nephrotoxic medications and procedures.      VTE Risk Mitigation (From admission, onward)           Ordered     heparin (porcine) injection 5,000 Units  Every 8 hours         11/15/24 1510     IP VTE HIGH RISK PATIENT  Once         11/15/24 1510     Place sequential compression device  Until discontinued         11/15/24 1510                       On 11/15/2024, patient should be placed in hospital observation services under my care.             NATY ACEVEDO MD  Department of Hospital Medicine  [facility] - [address] [facility] Tele[facility]

## 2024-11-16 NOTE — PLAN OF CARE
Problem: Adult Inpatient Plan of Care  Goal: Plan of Care Review  Outcome: Progressing  Goal: Patient-Specific Goal (Individualized)  Outcome: Progressing  Goal: Absence of Hospital-Acquired Illness or Injury  Outcome: Progressing  Goal: Optimal Comfort and Wellbeing  Outcome: Progressing  Goal: Readiness for Transition of Care  Outcome: Progressing     Problem: Diabetes Comorbidity  Goal: Blood Glucose Level Within Targeted Range  Outcome: Progressing     Problem: Skin Injury Risk Increased  Goal: Skin Health and Integrity  Outcome: Progressing     Problem: Gas Exchange Impaired  Goal: Optimal Gas Exchange  Outcome: Progressing

## 2024-11-16 NOTE — PROGRESS NOTES
[facility] - [address] [place] [address] Medicine  Progress Note    Patient Name: Eleuterio Foreman  MRN: [id number]  Patient Class: OP- Observation   Admission Date: 11/15/2024  Length of Stay: 0 days  Attending Physician: Zeferino Garcia MD  Primary Care Provider: [place]        Subjective:     Principal Problem:Volume overload        HPI:  Mr. Foreman is a 54 Y O male with PMH of CAD s/p CABG, PVD s/p right BKA, DM type II, HTN who presented to ED with chief complaint of chest pain and shortness of breath that started at 7AM. Patient stated that he woke up around 5AM feeling alright/at baseline. He was supposed to go to dialysis at 5AM but he got confused if it was his day today or not. Around 7AM he noted sharp central chest pain, non-radiating and associated with shortness of breath. No nausea, vomiting, cough, fever reported. He was out of regular S/L NTG, he denies any alleviating or aggravating factors. He has known CAD and reports this pain being similar to one that he had when he had his heart attack. He does have chronic angina as well. No leg swelling reported.     Overview/Hospital Course:  11/16- Undergoing dialysis today, Reports chest pain has improved. Restart Imdur and Coreg as BP acceptable to help with anginal pain. CTA chest negative for PE.     Interval History: Patient seen and examined at the bedside, reports feeling better but still have chest pain.     Review of Systems   Respiratory:  Negative for shortness of breath.    Cardiovascular:  Positive for chest pain.   All other systems reviewed and are negative.    Objective:     Vital Signs (Most Recent):  Temp: 97.4 °F (36.3 °C) (11/16/24 0750)  Pulse: 68 (11/16/24 1306)  Resp: 20 (11/16/24 1306)  BP: 113/60 (11/16/24 1135)  SpO2: (!) 93 % (11/16/24 1306) Vital Signs (24h Range):  Temp:  [97.4 °F (36.3 °C)-98.6 °F (37 °C)] 97.4 °F (36.3 °C)  Pulse:  [] 68  Resp:  [13-31] 20  SpO2:  [91 %-97 %] 93 %  BP:  ()/(35-70) 113/60     Weight: 99.8 kg (220 lb 0.3 oz)  Body mass index is 32.49 kg/m².    Intake/Output Summary (Last 24 hours) at 11/16/2024 1317  Last data filed at 11/16/2024 1135  Gross per 24 hour   Intake 0 ml   Output 4500 ml   Net -4500 ml         Physical Exam  Vitals and nursing note reviewed.   Constitutional:       Appearance: He is ill-appearing.   HENT:      Head: Normocephalic and atraumatic.      Nose: Nose normal.      Mouth/Throat:      Mouth: Mucous membranes are moist.   Eyes:      Extraocular Movements: Extraocular movements intact.   Cardiovascular:      Rate and Rhythm: Normal rate and regular rhythm.      Pulses: Normal pulses.      Heart sounds: Normal heart sounds.   Pulmonary:      Effort: Pulmonary effort is normal.      Breath sounds: Wheezing and rales present.   Abdominal:      General: Bowel sounds are normal.      Palpations: Abdomen is soft.   Musculoskeletal:      Cervical back: Normal range of motion.      Comments: S/p right BKA   Skin:     General: Skin is warm.      Coloration: Skin is pale.   Neurological:      General: No focal deficit present.      Mental Status: He is alert and oriented to person, place, and time. Mental status is at baseline.   Psychiatric:         Mood and Affect: Mood normal.         Behavior: Behavior normal.             Significant Labs: All pertinent labs within the past 24 hours have been reviewed.    Significant Imaging: I have reviewed all pertinent imaging results/findings within the past 24 hours.    Assessment/Plan:      * Volume overload  NT pro BNP elevated.  CXR suggestive of volume overload.  CT with large right sided pleural effusion.   Echo- Result Date: 7/31/2024      Left Ventricle: The left ventricle is normal in size. Mildly increased   wall thickness. There is concentric hypertrophy. There is normal systolic   function with a visually estimated ejection fraction of 55 - 60%. Ejection   fraction by visual approximation is 55%.  There is normal diastolic   function.    Right Ventricle: Mild right ventricular enlargement. Systolic function   is normal.    Left Atrium: Left atrium is mildly dilated.    Right Atrium: Right atrium is moderately dilated.    Aortic Valve: The aortic valve is a trileaflet valve. Mildly calcified   cusps.    Mitral Valve: There is mild bileaflet sclerosis. Mildly thickened   leaflets. There is mild regurgitation.    Tricuspid Valve: There is mild to moderate regurgitation.    IVC/SVC: Elevated venous pressure at 15 mmHg.    Pericardium: There is a trivial effusion. No indication of cardiac   tamponade.     Nephrology consulted to resume dialysis sessions.  Monitor oxygenation.       Precordial pain  Likely underlying angina contributing but BP limiting maximizing anti-anginal therapy.   Left heart cath in 8/2024:  - Three-vessel coronary artery disease with widely patent stent of the proximal RCA.    - Widely patent LIMA to the LAD.  - Widely patent SVG to the OM branch of the left circumflex.  - Left ventricular size at the upper limit of normal with inferior wall hypokinesis and overall mildly impaired left ventricular systolic function, ejection fraction 40%.      CTA chest negative for PE.   Maximize anti-anginal therapy as able.           Type 2 MI (myocardial infarction)  He has chronic angina but his BP is on the low side so limited ability to give NTG.  Troponin trend is flat, EKG is non-ischemic.  Resume home beta blocker, nitrates.   Monitor on tele.       Pleural effusion on right  Patient found to have large pleural effusion on imaging. I have personally reviewed and interpreted the following imaging: Xray and CT. A thoracentesis was deferred. Most likely etiology includes Volume overload not due to CHF. Management to include Dialysis  Consider thoracentesis later in course if no improvement in symptoms (on DAPT which cannot be held given significant CAD).       Hyponatremia  Hyponatremia is likely due  to renal insufficiency. The patient's most recent sodium results are listed below.  Recent Labs     11/15/24  1155 11/16/24  0359   * 126*       Plan  - Correct the sodium by 4-6mEq in 24 hours.   - Obtain the following studies: none.  - Will treat the hyponatremia with Hemodialysis  - Monitor sodium Daily.   - Patient hyponatremia is worsening, continue volume removal with dialysis.     Hypotension due to drugs  Lactate WNL.  Improved now.         Anemia of renal disease  Anemia is likely due to chronic disease due to ESRD. Most recent hemoglobin and hematocrit are listed below.  Recent Labs     11/15/24  1155 11/16/24  0359   HGB 8.6* 9.2*   HCT 26.5* 28.1*       Plan  - Monitor serial CBC: Daily  - Transfuse PRBC if patient becomes hemodynamically unstable, symptomatic or H/H drops below 7/21.  - Patient has not received any PRBC transfusions to date  - Patient's anemia is currently stable    Obesity (BMI 30-39.9)  Body mass index is 32.49 kg/m². Morbid obesity complicates all aspects of disease management from diagnostic modalities to treatment. Weight loss encouraged and health benefits explained to patient.         Asthma  Not in exacerbation.  Duo nebs.      CAD (coronary artery disease)  Patient with known CAD s/p stent placement and CABG, which is controlled Will continue ASA, Plavix, and Statin and monitor for S/Sx of angina/ACS. Continue to monitor on telemetry.     ESRD (end stage renal disease)  Creatine stable for now. BMP reviewed- noted Estimated Creatinine Clearance: 13.3 mL/min (A) (based on SCr of 7.38 mg/dL (H)). according to latest data. Based on current GFR, CKD stage is end stage.    On HD M/W/F, nephrology consulted to resume sessions.  He missed session on 11/15.   Monitor UOP and serial BMP and adjust therapy as needed.   Renally dose meds. Avoid nephrotoxic medications and procedures.      VTE Risk Mitigation (From admission, onward)           Ordered     heparin (porcine) injection  5,000 Units  Every 8 hours         11/15/24 1510     IP VTE HIGH RISK PATIENT  Once         11/15/24 1510     Place sequential compression device  Until discontinued         11/15/24 1510                    Discharge Planning   GRISELDA:      Code Status: Full Code   Is the patient medically ready for discharge?:     Reason for patient still in hospital (select all that apply): Patient trending condition                     NATY ACEVEDO MD  Department of Hospital Medicine   [facility] - [facility] Tele[facility]

## 2024-11-16 NOTE — ASSESSMENT & PLAN NOTE
NT pro BNP elevated.  CXR suggestive of volume overload.  CT with large right sided pleural effusion.   Echo- Result Date: 7/31/2024      Left Ventricle: The left ventricle is normal in size. Mildly increased   wall thickness. There is concentric hypertrophy. There is normal systolic   function with a visually estimated ejection fraction of 55 - 60%. Ejection   fraction by visual approximation is 55%. There is normal diastolic   function.    Right Ventricle: Mild right ventricular enlargement. Systolic function   is normal.    Left Atrium: Left atrium is mildly dilated.    Right Atrium: Right atrium is moderately dilated.    Aortic Valve: The aortic valve is a trileaflet valve. Mildly calcified   cusps.    Mitral Valve: There is mild bileaflet sclerosis. Mildly thickened   leaflets. There is mild regurgitation.    Tricuspid Valve: There is mild to moderate regurgitation.    IVC/SVC: Elevated venous pressure at 15 mmHg.    Pericardium: There is a trivial effusion. No indication of cardiac   tamponade.     Nephrology consulted to resume dialysis sessions.  Monitor oxygenation.

## 2024-11-16 NOTE — ASSESSMENT & PLAN NOTE
He has chronic angina but his BP is on the low side so limited ability to give NTG.  Troponin trend is flat, EKG is non-ischemic.  Resume home beta blocker, nitrates.   Monitor on tele.

## 2024-11-16 NOTE — HPI
Chief complaint is chest pain    History of present illness:  Mr. Foreman is a 54-year-old  gentleman who dialyzes on a Monday Wednesday Friday basis in [facility].  The patient's last dialysis was this past Wednesday.  The patient awoke yesterday morning complaining of chest pain he got confused as to what the day was and did not go to dialysis yesterday.  The patient states he was in his normal state of health prior to waking up with chest pain yesterday.  He has had some associated shortness of breath.  The patient's blood pressure was noted to be little bit low on admission with a systolic of around 96.  The patient has a known history of coronary artery disease with stent placement.  He had an angiogram done within the past few months that showed these to be open.  The patient's CT scan showed a large pleural effusion on the right side of his lower chest area.  So has an associated anemia with a hematocrit of 28%.

## 2024-11-16 NOTE — SUBJECTIVE & OBJECTIVE
Past Medical History:   Diagnosis Date    Diabetes mellitus     ESRD on hemodialysis     Hypertension        Past Surgical History:   Procedure Laterality Date    ANGIOGRAM, CORONARY, WITH LEFT HEART CATHETERIZATION N/A 8/1/2024    Procedure: Angiogram, Coronary, with Left Heart Cath;  Surgeon: Jose Elias Tineo MD;  Location: [facility] CATH LAB;  Service: Cardiology;  Laterality: N/A;       Review of patient's allergies indicates:  No Known Allergies    No current facility-administered medications on file prior to encounter.     Current Outpatient Medications on File Prior to Encounter   Medication Sig    amLODIPine (NORVASC) 2.5 MG tablet Take 2.5 mg by mouth once daily.    aspirin (ECOTRIN) 81 MG EC tablet Take 1 tablet by mouth every morning.    calcium acetate,phosphat bind, (PHOSLO) 667 mg capsule Take 1,334 mg by mouth 3 (three) times daily.    carvediloL (COREG) 12.5 MG tablet Take 12.5 mg by mouth 2 (two) times daily.    clopidogreL (PLAVIX) 75 mg tablet Take 1 tablet by mouth once daily.    isosorbide mononitrate (IMDUR) 60 MG 24 hr tablet Take 1 tablet by mouth every morning.    latanoprost 0.005 % ophthalmic solution Place 1 drop into both eyes every evening.    lisinopriL 10 MG tablet Take 10 mg by mouth 2 (two) times daily.    simvastatin (ZOCOR) 40 MG tablet Take 40 mg by mouth every evening.    [DISCONTINUED] lisinopriL (PRINIVIL,ZESTRIL) 5 MG tablet Take 5 mg by mouth once daily.    [DISCONTINUED] sevelamer carbonate (RENVELA) 800 mg Tab Take 2,400 mg by mouth 3 (three) times daily with meals.     Family History    None       Tobacco Use    Smoking status: Never    Smokeless tobacco: Never   Substance and Sexual Activity    Alcohol use: Not Currently    Drug use: Never    Sexual activity: Not Currently     Review of Systems   Respiratory:  Positive for chest tightness and shortness of breath.    All other systems reviewed and are negative.    Objective:     Vital Signs (Most Recent):  Temp: 98 °F  (36.7 °C) (11/15/24 1730)  Pulse: 63 (11/15/24 1730)  Resp: 15 (11/15/24 1730)  BP: (!) 104/36 (11/15/24 1800)  SpO2: (!) 94 % (11/15/24 1730) Vital Signs (24h Range):  Temp:  [98 °F (36.7 °C)-98.1 °F (36.7 °C)] 98 °F (36.7 °C)  Pulse:  [61-69] 63  Resp:  [12-31] 15  SpO2:  [91 %-100 %] 94 %  BP: ()/(35-55) 104/36     Weight: 99.8 kg (220 lb 0.3 oz)  Body mass index is 32.49 kg/m².     Physical Exam  Vitals and nursing note reviewed.   Constitutional:       Appearance: He is ill-appearing.   HENT:      Head: Normocephalic and atraumatic.      Nose: Nose normal.      Mouth/Throat:      Mouth: Mucous membranes are moist.   Eyes:      Extraocular Movements: Extraocular movements intact.   Cardiovascular:      Rate and Rhythm: Normal rate and regular rhythm.      Pulses: Normal pulses.      Heart sounds: Normal heart sounds.   Pulmonary:      Effort: Pulmonary effort is normal.      Breath sounds: Wheezing present.   Abdominal:      General: Bowel sounds are normal.      Palpations: Abdomen is soft.   Musculoskeletal:      Cervical back: Normal range of motion.      Comments: S/p right BKA   Skin:     General: Skin is warm.      Coloration: Skin is pale.   Neurological:      General: No focal deficit present.      Mental Status: He is alert and oriented to person, place, and time. Mental status is at baseline.   Psychiatric:         Mood and Affect: Mood normal.         Behavior: Behavior normal.                Significant Labs: All pertinent labs within the past 24 hours have been reviewed.    Significant Imaging: I have reviewed all pertinent imaging results/findings within the past 24 hours.

## 2024-11-17 PROBLEM — R65.10 SIRS (SYSTEMIC INFLAMMATORY RESPONSE SYNDROME): Status: ACTIVE | Noted: 2024-01-01

## 2024-11-17 PROBLEM — G93.41 ACUTE METABOLIC ENCEPHALOPATHY: Status: ACTIVE | Noted: 2024-01-01

## 2024-11-17 PROBLEM — J96.01 ACUTE RESPIRATORY FAILURE WITH HYPOXIA: Status: ACTIVE | Noted: 2024-01-01

## 2024-11-17 PROBLEM — A41.9 SEVERE SEPSIS: Status: ACTIVE | Noted: 2024-01-01

## 2024-11-17 PROBLEM — R65.20 SEVERE SEPSIS: Status: ACTIVE | Noted: 2024-01-01

## 2024-11-17 NOTE — ASSESSMENT & PLAN NOTE
Patient found to have large pleural effusion on imaging. I have personally reviewed and interpreted the following imaging: Xray and CT. A thoracentesis was deferred.   Most likely etiology includes Volume overload not due to CHF. Management to include Dialysis  Repeat CXR after dialysis show worsening pleural effusion.  Pulm consulted for thoracentesis.

## 2024-11-17 NOTE — ASSESSMENT & PLAN NOTE
Body mass index is 32.52 kg/m². Morbid obesity complicates all aspects of disease management from diagnostic modalities to treatment. Weight loss encouraged and health benefits explained to patient.

## 2024-11-17 NOTE — ASSESSMENT & PLAN NOTE
Creatine stable for now. BMP reviewed- noted Estimated Creatinine Clearance: 19.3 mL/min (A) (based on SCr of 5.09 mg/dL (H)). according to latest data. Based on current GFR, CKD stage is end stage.    On HD M/W/F, nephrology consulted to resume sessions.  He missed session on 11/15.   Monitor UOP and serial BMP and adjust therapy as needed.   Renally dose meds. Avoid nephrotoxic medications and procedures.

## 2024-11-17 NOTE — CARE UPDATE
Brief Cardiology Note:    Patient not seen; however chart reviewed and discussed with primary team.    Cardiology consulted for ongoing angina without BP room to titrate medications:    Had recent Cath with diffuse 3VCAD with patent grafts.     Plan:  Change coreg to metoprolol 25mg bid, continue IMDUR 60mg qd, Start ranexa 500mg qd (ESRD dosing)  Agree with pleural tap.  Full cardiology consult to follow on Monday.     Ish Hall  11/17/2024

## 2024-11-17 NOTE — PROGRESS NOTES
Patient is seen in the ICU. The patient was poorly comprehensible upon my interactions. He mostly moaned and groaned and a little bit. He is moderately worse from a mentation standpoint than he was yesterday when I saw him on dialysis.    Physical exam, General Patients, chronically ill appearing, ford is regular rate and rhythm, he has no pitting edema, lungs are clear to auscultation anteriorly, abdomen is soft with bowel sounds    Assessment/plan number one. He SRD will plan on dialysis Tuesday. Patient was down last Saturday.  Two. Altered mental status-suspect this is multifactorial a in ETiology including infection, lowish blood pressure in the background of a fairly substantial stroke in the past.. agree with starting empiric antibiotic therapy has discussed with pulmonary medicine today.  Three. Pleural effusion  patient has to have a thoracentesis today.

## 2024-11-17 NOTE — ASSESSMENT & PLAN NOTE
Confused and requiring restraints on 11/17.  ABG without CO2 retention but with hypoxia.  Ammonia WNL, TSH/B12 pending.   No reported alcohol use.   Consider neuro-imaging if this does not improve or develops focal signs on exam.

## 2024-11-17 NOTE — ASSESSMENT & PLAN NOTE
Symptomatic hyponatremia with AMS; remains with Na >120. Medications reviewed. S/p HD 11/16 with progression in hypoNa. Hypervolemic hypoNa.   - will assess with CT Head non con first  - obtain random cortisol  - NPO  - will consider hypertonic saline bolus for mental status; 3% 100cc x1  - repeat BMP 1 hour after saline

## 2024-11-17 NOTE — ASSESSMENT & PLAN NOTE
Patient with Hypoxic Respiratory failure which is Acute.  he is not on home oxygen. Supplemental oxygen was provided and noted- Oxygen Concentration (%):  [28-40] 36    .   Signs/symptoms of respiratory failure include- tachypnea and increased work of breathing. Contributing diagnoses includes - Pleural effusion Labs and images were reviewed. Patient Has recent ABG, which has been reviewed. Will treat underlying causes and adjust management of respiratory failure as follows-     Pulm consulted for thoracentesis.  Volume removal with dialysis.   Wean O2 as able.

## 2024-11-17 NOTE — NURSING
Patient was seeming to have difficulties breathing.  He has been pulling off his nasal cannula and telemetry leads all night. A restraint order was put in. Patient was resting comfortable. Patient was placed back on oxygen and was sating 91 on 4L and seeming to have difficulty breathing. Respiratory was called  to come up to check on patient. Respiratory therapist Yulia came up to check on patient and suggested that I get in contact with a doctor. I messaged Dr. Bello about findings and  Dr. Amena Barragan was added to the chat. Dr. Barragan came up to assess the patient. ABGs were collected and a CT was ordered due to the patient increase onset mental changes. ABG were PH :7.47 PCO2: 35 PO2: 62 PHCO3: 25.5 O2 93. Patient's glucose was also checked and is 266.On the way to CT Dr. Bello came up and cancelled the CT order. Patient is now resting  in room. Diabetic orders are put in and patient is getting 3 units of insulin aspart . Restraints are being put back on the patient to avoid removing nasal cannula and telemetry leads.

## 2024-11-17 NOTE — PROCEDURES
"Eleuterio Foreman is a 54 y.o. male patient.    Temp: 98.5 °F (36.9 °C) (24 132)  Pulse: 68 (24 1500)  Resp: 14 (24 1500)  BP: (!) 79/37 (24 1500)  SpO2: (!) 89 % (24 1500)  Weight: 84.1 kg (185 lb 6.5 oz) (24)  Height: 5' 9" (175.3 cm) (24)       Chest Tube Insertion    Date/Time: 2024 5:35 PM  Location procedure was performed: [facility] CRIT[facility]    Performed by: Inessa Benitez MD  Authorized by: Inessa Benitez MD  Consent Done: Yes  Consent: Verbal consent obtained. Written consent obtained.  Risks and benefits: risks, benefits and alternatives were discussed  Consent given by: spouse  Patient identity confirmed: , MRN and name  Indications: pleural effusion    Patient sedated: no  Anesthesia: local infiltration    Anesthesia:  Local Anesthetic: lidocaine 1% without epinephrine  Anesthetic total: 5 mL  Preparation: skin prepped with ChloraPrep  Placement location: right lateral  Scalpel size: 10  Tube size (Tajik): 14 Fr.  Ultrasound guidance: yes  Tension pneumothorax heard: no  Tube connected to: suction  Drainage characteristics: bloody and cloudy  Drainage amount: 780 ml  Suture material: 0 silk  Dressing: 4x4 sterile gauze and petrolatum-impregnated gauze  Post-insertion x-ray findings: tube in good position  Post-insertion x-ray comments: ultrasound guided  Technical procedures used: COOK PNEUMOTRAY KIt  Comments: Chest Xray pending at time of note. Fluid studies sent.           Inessa Benitez MD  Pulmonary and Critical Care  [facility]      2024    "

## 2024-11-17 NOTE — ASSESSMENT & PLAN NOTE
Hx of R MCA and R PCA CVA. Progression in change in mental status, concern for exacerbation post dialysis. Gas w/o hypercapnia and ammonia wnl. Febrile with concern for septic encephalopathy. Ddx includes disequilibrium syndrome vs symptomatic hyponatremia with Na lowest to date.   - obtain CT Head STAT  - will consider MRI  - plan on hypertonic saline bolus x1 upon transfer to ICU  - infectious w/u

## 2024-11-17 NOTE — PROGRESS NOTES
[facility] - [address] [place] [address] Medicine  Progress Note    Patient Name: Eleuterio Foreman  MRN: [id number]  Patient Class: IP- Inpatient   Admission Date: 11/15/2024  Length of Stay: 1 days  Attending Physician: Zeferino Garcia MD  Primary Care Provider: [place], [facility]        Subjective:     Principal Problem:Acute respiratory failure with hypoxia        HPI:  Mr. Foreman is a 54 Y O male with PMH of CAD s/p CABG, PVD s/p right BKA, DM type II, HTN who presented to ED with chief complaint of chest pain and shortness of breath that started at 7AM. Patient stated that he woke up around 5AM feeling alright/at baseline. He was supposed to go to dialysis at 5AM but he got confused if it was his day today or not. Around 7AM he noted sharp central chest pain, non-radiating and associated with shortness of breath. No nausea, vomiting, cough, fever reported. He was out of regular S/L NTG, he denies any alleviating or aggravating factors. He has known CAD and reports this pain being similar to one that he had when he had his heart attack. He does have chronic angina as well. No leg swelling reported.     Overview/Hospital Course:  11/16- Undergoing dialysis today, Reports chest pain has improved. Restart Imdur and Coreg as BP acceptable to help with anginal pain. CTA chest negative for PE.     11/17- CXR shows worsening pleural effusion, Pulm consulted for thoracentesis given worsening hypoxia. Cardiology consulted for ongoing anginal symptoms and medications adjusted. Confused and requiring restraints now.     Interval History: Patient seen and examined at the bedside, more confused overnight and restraints put on given getting out of bed and pulling on lines.     Review of Systems   Unable to perform ROS: Mental status change     Objective:     Vital Signs (Most Recent):  Temp: 98.3 °F (36.8 °C) (11/17/24 0744)  Pulse: 71 (11/17/24 0951)  Resp: 18 (11/17/24 0744)  BP: 106/66 (11/17/24  0744)  SpO2: (!) 93 % (11/17/24 0900) Vital Signs (24h Range):  Temp:  [98.1 °F (36.7 °C)-100.4 °F (38 °C)] 98.3 °F (36.8 °C)  Pulse:  [60-77] 71  Resp:  [16-24] 18  SpO2:  [71 %-95 %] 93 %  BP: ()/(52-72) 106/66     Weight: 99.9 kg (220 lb 3.8 oz)  Body mass index is 32.52 kg/m².    Intake/Output Summary (Last 24 hours) at 11/17/2024 1011  Last data filed at 11/16/2024 1135  Gross per 24 hour   Intake 0 ml   Output 4500 ml   Net -4500 ml         Physical Exam  Constitutional:       General: He is awake. He is not in acute distress.     Appearance: He is ill-appearing.   HENT:      Head: Normocephalic.      Mouth/Throat:      Mouth: Mucous membranes are dry.   Eyes:      Pupils: Pupils are equal, round, and reactive to light.   Cardiovascular:      Rate and Rhythm: Normal rate and regular rhythm.      Heart sounds: Normal heart sounds.   Pulmonary:      Effort: No respiratory distress.      Breath sounds: Examination of the right-middle field reveals decreased breath sounds. Examination of the right-lower field reveals decreased breath sounds. Decreased breath sounds and rales present.      Comments: On 4L NC  Abdominal:      General: There is distension.      Palpations: Abdomen is soft.      Tenderness: There is no abdominal tenderness.   Musculoskeletal:      Cervical back: No rigidity.      Right lower leg: No edema.      Left lower leg: No edema.      Comments: S/p right BKA   Skin:     Coloration: Skin is pale.   Neurological:      General: No focal deficit present.      Mental Status: He is disoriented.             Significant Labs: All pertinent labs within the past 24 hours have been reviewed.    Significant Imaging: I have reviewed all pertinent imaging results/findings within the past 24 hours.    Assessment/Plan:      * Acute respiratory failure with hypoxia  Patient with Hypoxic Respiratory failure which is Acute.  he is not on home oxygen. Supplemental oxygen was provided and noted- Oxygen  Concentration (%):  [28-40] 36    .   Signs/symptoms of respiratory failure include- tachypnea and increased work of breathing. Contributing diagnoses includes - Pleural effusion Labs and images were reviewed. Patient Has recent ABG, which has been reviewed. Will treat underlying causes and adjust management of respiratory failure as follows-     Pulm consulted for thoracentesis.  Volume removal with dialysis.   Wean O2 as able.     Pleural effusion on right  Patient found to have large pleural effusion on imaging. I have personally reviewed and interpreted the following imaging: Xray and CT. A thoracentesis was deferred.   Most likely etiology includes Volume overload not due to CHF. Management to include Dialysis  Repeat CXR after dialysis show worsening pleural effusion.  Pulm consulted for thoracentesis.       Volume overload  NT pro BNP elevated.  CXR suggestive of volume overload.  CT with large right sided pleural effusion.   Echo- Result Date: 7/31/2024      Left Ventricle: The left ventricle is normal in size. Mildly increased   wall thickness. There is concentric hypertrophy. There is normal systolic   function with a visually estimated ejection fraction of 55 - 60%. Ejection   fraction by visual approximation is 55%. There is normal diastolic   function.    Right Ventricle: Mild right ventricular enlargement. Systolic function   is normal.    Left Atrium: Left atrium is mildly dilated.    Right Atrium: Right atrium is moderately dilated.    Aortic Valve: The aortic valve is a trileaflet valve. Mildly calcified   cusps.    Mitral Valve: There is mild bileaflet sclerosis. Mildly thickened   leaflets. There is mild regurgitation.    Tricuspid Valve: There is mild to moderate regurgitation.    IVC/SVC: Elevated venous pressure at 15 mmHg.    Pericardium: There is a trivial effusion. No indication of cardiac   tamponade.     Nephrology consulted to resume dialysis sessions.  Monitor oxygenation.       Acute  metabolic encephalopathy  Confused and requiring restraints on 11/17.  ABG without CO2 retention but with hypoxia.  Ammonia WNL, TSH/B12 pending.   No reported alcohol use.   Consider neuro-imaging if this does not improve or develops focal signs on exam.       SIRS (systemic inflammatory response syndrome)  Temp 100.4, leukocytosis of 15K noted on 11/17 in the setting of worsening encephalopathy.  Check UA- mild urine output reported(ESRD).   Chest imaging with effusion, likely from renal disease but will check Lyte's criteria/culture once thora is done.  Blood cultures x 2.  Lactate WNL.   Hold on antibiotics for now but low threshold to start and expand infectious workup pending clinical course.       Precordial pain  Likely underlying angina contributing but BP limiting maximizing anti-anginal therapy.   Left heart cath in 8/2024:  - Three-vessel coronary artery disease with widely patent stent of the proximal RCA.    - Widely patent LIMA to the LAD.  - Widely patent SVG to the OM branch of the left circumflex.  - Left ventricular size at the upper limit of normal with inferior wall hypokinesis and overall mildly impaired left ventricular systolic function, ejection fraction 40%.      CTA chest negative for PE.   Cardiology consulted given limited ability to maximize anti-anginal therapy given low BP, recommend adding Ranexa and changing Coreg to Metoprolol for less BP response.   Maximize anti-anginal therapy as able.           Hypotension due to drugs  Lactate WNL.  Improved now.         Anemia of renal disease  Anemia is likely due to chronic disease due to ESRD. Most recent hemoglobin and hematocrit are listed below.  Recent Labs     11/15/24  1155 11/16/24  0359 11/17/24  0331   HGB 8.6* 9.2* 9.5*   HCT 26.5* 28.1* 28.7*       Plan  - Monitor serial CBC: Daily  - Transfuse PRBC if patient becomes hemodynamically unstable, symptomatic or H/H drops below 7/21.  - Patient has not received any PRBC transfusions  to date  - Patient's anemia is currently stable    Hyponatremia  Hyponatremia is likely due to renal insufficiency. The patient's most recent sodium results are listed below.  Recent Labs     11/15/24  1155 11/16/24  0359 11/17/24  0331   * 126* 125*       Plan  - Correct the sodium by 4-6mEq in 24 hours.   - Obtain the following studies: none.  - Will treat the hyponatremia with Hemodialysis  - Monitor sodium Daily.   - Patient hyponatremia is worsening, clinical exam consistent with dehydration however significant pleural effusion noted so avoiding IV fluids.     Type 2 MI (myocardial infarction)  He has chronic angina but his BP is on the low side so limited ability to give NTG.  Troponin trend is flat, EKG is non-ischemic.  Resume home beta blocker, nitrates.   Monitor on tele.       Obesity (BMI 30-39.9)  Body mass index is 32.52 kg/m². Morbid obesity complicates all aspects of disease management from diagnostic modalities to treatment. Weight loss encouraged and health benefits explained to patient.         Asthma  Not in exacerbation.  Duo nebs.      CAD (coronary artery disease)  Patient with known CAD s/p stent placement and CABG, which is controlled Will continue ASA, Plavix, and Statin and monitor for S/Sx of angina/ACS. Continue to monitor on telemetry.     ESRD (end stage renal disease)  Creatine stable for now. BMP reviewed- noted Estimated Creatinine Clearance: 19.3 mL/min (A) (based on SCr of 5.09 mg/dL (H)). according to latest data. Based on current GFR, CKD stage is end stage.    On HD M/W/F, nephrology consulted to resume sessions.  He missed session on 11/15.   Monitor UOP and serial BMP and adjust therapy as needed.   Renally dose meds. Avoid nephrotoxic medications and procedures.      VTE Risk Mitigation (From admission, onward)           Ordered     heparin (porcine) injection 5,000 Units  Every 8 hours         11/15/24 1510     IP VTE HIGH RISK PATIENT  Once         11/15/24 1510      Place sequential compression device  Until discontinued         11/15/24 1510                    Discharge Planning   GRISELDA: 11/20/2024     Code Status: Full Code   Is the patient medically ready for discharge?:     Reason for patient still in hospital (select all that apply): Patient trending condition and Consult recommendations               NATY ACEVEDO MD  Department of Hospital Medicine   [facility] - [facility] Telemetry

## 2024-11-17 NOTE — ASSESSMENT & PLAN NOTE
Progressing despite HD 11/16. Large R pleural effusion complicating picture. ABG with hypoxemia and increased A-a gradient.   - start HFNC 50L/50%  - titrate FiO2 for goal SpO2 >94%  - planned thoracentesis vs pigtail

## 2024-11-17 NOTE — ASSESSMENT & PLAN NOTE
Anemia is likely due to chronic disease due to ESRD. Most recent hemoglobin and hematocrit are listed below.  Recent Labs     11/15/24  1155 11/16/24  0359 11/17/24  0331   HGB 8.6* 9.2* 9.5*   HCT 26.5* 28.1* 28.7*       Plan  - Monitor serial CBC: Daily  - Transfuse PRBC if patient becomes hemodynamically unstable, symptomatic or H/H drops below 7/21.  - Patient has not received any PRBC transfusions to date  - Patient's anemia is currently stable

## 2024-11-17 NOTE — SUBJECTIVE & OBJECTIVE
Interval History: Patient seen and examined at the bedside, more confused overnight and restraints put on given getting out of bed and pulling on lines.     Review of Systems   Unable to perform ROS: Mental status change     Objective:     Vital Signs (Most Recent):  Temp: 98.3 °F (36.8 °C) (11/17/24 0744)  Pulse: 71 (11/17/24 0951)  Resp: 18 (11/17/24 0744)  BP: 106/66 (11/17/24 0744)  SpO2: (!) 93 % (11/17/24 0900) Vital Signs (24h Range):  Temp:  [98.1 °F (36.7 °C)-100.4 °F (38 °C)] 98.3 °F (36.8 °C)  Pulse:  [60-77] 71  Resp:  [16-24] 18  SpO2:  [71 %-95 %] 93 %  BP: ()/(52-72) 106/66     Weight: 99.9 kg (220 lb 3.8 oz)  Body mass index is 32.52 kg/m².    Intake/Output Summary (Last 24 hours) at 11/17/2024 1011  Last data filed at 11/16/2024 1135  Gross per 24 hour   Intake 0 ml   Output 4500 ml   Net -4500 ml         Physical Exam  Constitutional:       General: He is awake. He is not in acute distress.     Appearance: He is ill-appearing.   HENT:      Head: Normocephalic.      Mouth/Throat:      Mouth: Mucous membranes are dry.   Eyes:      Pupils: Pupils are equal, round, and reactive to light.   Cardiovascular:      Rate and Rhythm: Normal rate and regular rhythm.      Heart sounds: Normal heart sounds.   Pulmonary:      Effort: No respiratory distress.      Breath sounds: Examination of the right-middle field reveals decreased breath sounds. Examination of the right-lower field reveals decreased breath sounds. Decreased breath sounds and rales present.      Comments: On 4L NC  Abdominal:      General: There is distension.      Palpations: Abdomen is soft.      Tenderness: There is no abdominal tenderness.   Musculoskeletal:      Cervical back: No rigidity.      Right lower leg: No edema.      Left lower leg: No edema.      Comments: S/p right BKA   Skin:     Coloration: Skin is pale.   Neurological:      General: No focal deficit present.      Mental Status: He is disoriented.             Significant  Labs: All pertinent labs within the past 24 hours have been reviewed.    Significant Imaging: I have reviewed all pertinent imaging results/findings within the past 24 hours.

## 2024-11-17 NOTE — ASSESSMENT & PLAN NOTE
Large R pleural effusion a/w R lobar atelectasis vs rounded consolidation. Now with fever concerning for complex effusion.   - plan for POCUS and therapeutic thoracentesis vs R pigtail placement  - given fever and clinical deterioration including hypoxia, attempted to reach wife for consent and unavailable, will perform procedure under emergent considerations

## 2024-11-17 NOTE — NURSING
Pt assisted up in bed and positioned to eat breakfast. Restraint untied for pt to feed himself. Pt would not feed self as he is unable to follow commands and mentation is altered. Pt would only pull off 02. Pt feed breakfast per nurse. Consumed 25%.

## 2024-11-17 NOTE — CARE UPDATE
Holding on hypertonic saline for patient awakening. Delirium present and redirectable.   Wife provided history of R thoracentesis last Monday; with recurrence in effusion in <7 days and fever decided on R pigtail placement.   Notified of GPC in both sets of BCx. Adding on Vancomycin to Zosyn. Pleural fluid studies pending.

## 2024-11-17 NOTE — NURSING
at bedside along with ICU nurse with plan to attempt bedside thoracentesis vs chest tube.Unable to perform procedure at bedside d/t pts 02 sat 90% on 5L NC and AMS. Pt will be transferred to unit  for closer monitoring and possibly thoracentesis and or chest tube. Dr. Garcia notified per .

## 2024-11-17 NOTE — ASSESSMENT & PLAN NOTE
NSTEMI 2/2 demand/type 2 ischemia. Tn peaked on first draw. Last CorAngio 08/2024 reviewed.   - obtain Tn x1 and EKG with change in mental status

## 2024-11-17 NOTE — NURSING
Dr. Hall notified via secure chat of cardiology consult.    RDS of  RDS of  RDS of  RDS of  RDS of  RDS of

## 2024-11-17 NOTE — SUBJECTIVE & OBJECTIVE
Past Medical History:   Diagnosis Date    Diabetes mellitus     ESRD on hemodialysis     Hypertension        Past Surgical History:   Procedure Laterality Date    ANGIOGRAM, CORONARY, WITH LEFT HEART CATHETERIZATION N/A 8/1/2024    Procedure: Angiogram, Coronary, with Left Heart Cath;  Surgeon: Jose Elias Tineo MD;  Location: [facility] CATH LAB;  Service: Cardiology;  Laterality: N/A;       Review of patient's allergies indicates:  No Known Allergies    Family History    None       Tobacco Use    Smoking status: Never    Smokeless tobacco: Never   Substance and Sexual Activity    Alcohol use: Not Currently    Drug use: Never    Sexual activity: Not Currently         Review of Systems  Objective:     Vital Signs (Most Recent):  Temp: 98.8 °F (37.1 °C) (11/17/24 1133)  Pulse: 73 (11/17/24 1133)  Resp: 18 (11/17/24 1133)  BP: 104/63 (11/17/24 1133)  SpO2: (!) 88 % (11/17/24 1133) Vital Signs (24h Range):  Temp:  [98.1 °F (36.7 °C)-100.4 °F (38 °C)] 98.8 °F (37.1 °C)  Pulse:  [60-77] 73  Resp:  [16-24] 18  SpO2:  [71 %-95 %] 88 %  BP: ()/(52-72) 104/63     Weight: 99.9 kg (220 lb 3.8 oz)  Body mass index is 32.52 kg/m².    No intake or output data in the 24 hours ending 11/17/24 1230     Physical Exam  Constitutional:       Appearance: He is ill-appearing.   HENT:      Head: Normocephalic and atraumatic.      Mouth/Throat:      Mouth: Mucous membranes are dry.   Eyes:      General: No scleral icterus.  Cardiovascular:      Rate and Rhythm: Normal rate and regular rhythm.      Heart sounds: Murmur heard.   Pulmonary:      Effort: Pulmonary effort is normal.      Breath sounds: Rales present. No wheezing.   Abdominal:      Palpations: Abdomen is soft.      Tenderness: There is no abdominal tenderness. There is guarding.   Musculoskeletal:      Right lower leg: No edema.      Left lower leg: No edema.      Comments: S/p R BKA   Skin:     Coloration: Skin is not jaundiced.      Comments: Cool to touch    Neurological:      Comments: Generalized weakness, non focal, cannot verbalize words or sentences, groaning with repositioning, altered          Vents:  Oxygen Concentration (%): 36 (11/17/24 0000)    Lines/Drains/Airways       Peripheral Intravenous Line  Duration                  Hemodialysis AV Fistula Left upper arm -- days         Peripheral IV - Single Lumen 11/15/24 1154 20 G Anterior;Right Forearm 2 days                    Significant Labs:    CBC/Anemia Profile:  Recent Labs   Lab 11/16/24  0359 11/17/24  0331   WBC 12.91* 15.78*   HGB 9.2* 9.5*   HCT 28.1* 28.7*   * 114*   .1* 99.3*   RDW 14.6* 14.5        Chemistries:  Recent Labs   Lab 11/16/24  0359 11/17/24  0331   * 125*   K 4.6 4.4   CL 90* 89*   CO2 20* 25   BUN 77* 49*   CREATININE 7.38* 5.09*   CALCIUM 7.9* 7.8*   MG 2.3 2.2       All pertinent labs within the past 24 hours have been reviewed.    Significant Imaging:   I have reviewed all pertinent imaging results/findings within the past 24 hours.

## 2024-11-17 NOTE — HPI
53 yo M with 3vd CAD, s/p CABG, PVD s/p R BKA< DIIM, HTN and ESRD on HD was hopsitalized 11/15 having presented with confusion and chest pain with work up notable for flat troponin peaked on first draw, CT-PE negative and R large pleural effusion with course c/b progressive hypoxia despite HD 11/16 with 4.5L out with this service consulted for R thoracentesis. He was transferred to ICU for AMS a/w hyponatremia and hypoxic respiratory failure.

## 2024-11-17 NOTE — PROGRESS NOTES
Initial Pharmacy Consult    Consulted to assist in the management of Vanc therapy in this 53 y/o male with bacteremia.     Labs: BUN    49            SCR:   5.09            CRCL: 17    Based on the patient's weight of 84kg and the most recent lab data available, the following therapy will be initiated: Vanc 1750mg x 1 dose . Will check Vanc level once dialysis days are know.    Will make adjustments if necessary.  Will Continue to monitor.    DEZ Spence.Ph.

## 2024-11-17 NOTE — ASSESSMENT & PLAN NOTE
Likely underlying angina contributing but BP limiting maximizing anti-anginal therapy.   Left heart cath in 8/2024:  - Three-vessel coronary artery disease with widely patent stent of the proximal RCA.    - Widely patent LIMA to the LAD.  - Widely patent SVG to the OM branch of the left circumflex.  - Left ventricular size at the upper limit of normal with inferior wall hypokinesis and overall mildly impaired left ventricular systolic function, ejection fraction 40%.      CTA chest negative for PE.   Cardiology consulted given limited ability to maximize anti-anginal therapy given low BP, recommend adding Ranexa and changing Coreg to Metoprolol for less BP response.   Maximize anti-anginal therapy as able.

## 2024-11-17 NOTE — ASSESSMENT & PLAN NOTE
S/p CABG.   - transition to Lopressor 25 mg BID  - cont Imdur 60 mg Qday an start Ranexa for chronic angina   - appreciate Cardiology recs

## 2024-11-17 NOTE — ASSESSMENT & PLAN NOTE
Progression in change in mental status, concern for exacerbation post dialysis. Gas w/o hypercapnia and ammonia wnl. Febrile with concern for septic encephalopathy. Ddx includes disequilibrium syndrome vs symptomatic hyponatremia with Na lowest to date.   - obtain CT Head STAT  - will consider MRI  - plan on hypertonic saline bolus x1 upon transfer to ICU  - infectious w/u

## 2024-11-17 NOTE — NURSING
Patient is constantly taking off nasal cannula and heart monitor leads. Educated patient on the importance of having both on. Patient stated that he understood and continued to take them both off again.

## 2024-11-17 NOTE — CONSULTS
[facility] - [address] [place], [place] [address]  Pulmonology  Consult Note    Patient Name: Eleuterio Foreman  MRN: [id number]  Admission Date: 11/15/2024  Hospital Length of Stay: 1 days  Code Status: Full Code  Attending Physician: Zeferino Garcia MD  Primary Care Provider: [facility]   Principal Problem: Acute respiratory failure with hypoxia    Inpatient consult to Pulmonology  Consult performed by: Inessa Benitez MD  Consult ordered by: Zeferino Garcia MD        Subjective:     HPI:  53 yo M with 3vd CAD, s/p CABG, PVD s/p R BKA< DIIM, HTN and ESRD on HD was hopsitalized 11/15 having presented with confusion and chest pain with work up notable for flat troponin peaked on first draw, CT-PE negative and R large pleural effusion with course c/b progressive hypoxia despite HD 11/16 with 4.5L out with this service consulted for R thoracentesis for progressive respiatory failure.      has had a change in his mental status in the past two days initially characterized by combativeness and now somnolence. For evaluation, he has had an unremarkable Lactate <2, Ammonia wnl and serial ABG with no hypercapnia. At time of my assessment, he is somnolent and awakening to painful stimulus. SpO2 is 90% on 4L NC.     Past Medical History:   Diagnosis Date    Diabetes mellitus     ESRD on hemodialysis     Hypertension        Past Surgical History:   Procedure Laterality Date    ANGIOGRAM, CORONARY, WITH LEFT HEART CATHETERIZATION N/A 8/1/2024    Procedure: Angiogram, Coronary, with Left Heart Cath;  Surgeon: Jose Elias Tineo MD;  Location: [facility] CATH LAB;  Service: Cardiology;  Laterality: N/A;       Review of patient's allergies indicates:  No Known Allergies    Family History    None       Tobacco Use    Smoking status: Never    Smokeless tobacco: Never   Substance and Sexual Activity    Alcohol use: Not Currently    Drug use: Never    Sexual activity: Not Currently         Review of Systems  Objective:      Vital Signs (Most Recent):  Temp: 98.8 °F (37.1 °C) (11/17/24 1133)  Pulse: 73 (11/17/24 1133)  Resp: 18 (11/17/24 1133)  BP: 104/63 (11/17/24 1133)  SpO2: (!) 88 % (11/17/24 1133) Vital Signs (24h Range):  Temp:  [98.1 °F (36.7 °C)-100.4 °F (38 °C)] 98.8 °F (37.1 °C)  Pulse:  [60-77] 73  Resp:  [16-24] 18  SpO2:  [71 %-95 %] 88 %  BP: ()/(52-72) 104/63     Weight: 99.9 kg (220 lb 3.8 oz)  Body mass index is 32.52 kg/m².    No intake or output data in the 24 hours ending 11/17/24 1230     Physical Exam  Constitutional:       Appearance: He is ill-appearing.   HENT:      Head: Normocephalic and atraumatic.      Mouth/Throat:      Mouth: Mucous membranes are dry.   Eyes:      General: No scleral icterus.  Cardiovascular:      Rate and Rhythm: Normal rate and regular rhythm.      Heart sounds: Murmur heard.   Pulmonary:      Effort: Pulmonary effort is normal.      Breath sounds: Rales present. No wheezing.   Abdominal:      Palpations: Abdomen is soft.      Tenderness: There is no abdominal tenderness. There is guarding.   Musculoskeletal:      Right lower leg: No edema.      Left lower leg: No edema.      Comments: S/p R BKA   Skin:     Coloration: Skin is not jaundiced.      Comments: Cool to touch   Neurological:      Comments: Generalized weakness, non focal, cannot verbalize words or sentences, groaning with repositioning, altered          Vents:  Oxygen Concentration (%): 36 (11/17/24 0000)    Lines/Drains/Airways       Peripheral Intravenous Line  Duration                  Hemodialysis AV Fistula Left upper arm -- days         Peripheral IV - Single Lumen 11/15/24 1154 20 G Anterior;Right Forearm 2 days                    Significant Labs:    CBC/Anemia Profile:  Recent Labs   Lab 11/16/24  0359 11/17/24  0331   WBC 12.91* 15.78*   HGB 9.2* 9.5*   HCT 28.1* 28.7*   * 114*   .1* 99.3*   RDW 14.6* 14.5        Chemistries:  Recent Labs   Lab 11/16/24  0359 11/17/24  0331   * 125*   K  4.6 4.4   CL 90* 89*   CO2 20* 25   BUN 77* 49*   CREATININE 7.38* 5.09*   CALCIUM 7.9* 7.8*   MG 2.3 2.2       All pertinent labs within the past 24 hours have been reviewed.    Significant Imaging:   I have reviewed all pertinent imaging results/findings within the past 24 hours.  Assessment/Plan:     Neuro  Acute metabolic encephalopathy  Hx of R MCA and R PCA CVA. Progression in change in mental status, concern for exacerbation post dialysis. Gas w/o hypercapnia and ammonia wnl. Febrile with concern for septic encephalopathy. Ddx includes disequilibrium syndrome vs symptomatic hyponatremia with Na lowest to date.   - obtain CT Head STAT  - will consider MRI  - plan on hypertonic saline bolus x1 upon transfer to ICU  - infectious w/u    Pulmonary  * Acute respiratory failure with hypoxia  Progressing despite HD 11/16. Large R pleural effusion complicating picture. ABG with hypoxemia and increased A-a gradient.   - start HFNC 50L/50%  - titrate FiO2 for goal SpO2 >94%  - planned thoracentesis vs pigtail    Pleural effusion on right  Large R pleural effusion a/w R lobar atelectasis vs rounded consolidation. Now with fever concerning for complex effusion.   - plan for POCUS and therapeutic thoracentesis vs R pigtail placement  - given fever and clinical deterioration including hypoxia, attempted to reach wife for consent and unavailable, will perform procedure under emergent considerations      Cardiac/Vascular  Type 2 MI (myocardial infarction)  NSTEMI 2/2 demand/type 2 ischemia. Tn peaked on first draw. Last CorAngio 08/2024 reviewed.   - obtain Tn x1 and EKG with change in mental status    CAD (coronary artery disease)  S/p CABG.   - transition to Lopressor 25 mg BID  - cont Imdur 60 mg Qday an start Ranexa for chronic angina   - appreciate Cardiology recs    Severe Sepsis  Hx of serial paracentesis for abdominal ascites x2 (last 10/2024). Course with severe sepsis with AMS. Pulmonary source high in differential.  Infectious w/u with BCx x2 and planned pleural fluid Cxs. Will start Zosyn and de-escalate to off for unremarkable pleural fluid studies.     Renal/  ESRD (end stage renal disease)  ESRD on HD.   - HD per Nephrology; T, Th, Sat      Endocrine  Hyponatremia  Symptomatic hyponatremia with AMS; remains with Na >120. Medications reviewed. S/p HD 11/16 with progression in hypoNa. Hypervolemic hypoNa.   - will assess with CT Head non con first  - obtain random cortisol  - NPO  - will consider hypertonic saline bolus for mental status; 3% 100cc x1  - repeat BMP 1 hour after saline          Thank you for your consult.  Transfer patient to ICU for continued management as above.     Critical Care Time: 40 minutes  Critical secondary to Patient has a condition that poses threat to life and bodily function: Altered mental status, Acute respirator failure, Hyponatremia     Critical care was time spent personally by me on the following activities: development of treatment plan with patient or surrogate and bedside caregivers, discussions with consultants, evaluation of patient's response to treatment, examination of patient, ordering and performing treatments and interventions, ordering and review of laboratory studies, ordering and review of radiographic studies, pulse oximetry, re-evaluation of patient's condition. This critical care time did not overlap with that of any other provider or involve time for any procedures.        Inessa Benitez MD  Pulmonology and Critical Care  [facility] - [address] [place] [address]

## 2024-11-17 NOTE — PLAN OF CARE
Problem: Adult Inpatient Plan of Care  Goal: Plan of Care Review  Outcome: Progressing  Goal: Patient-Specific Goal (Individualized)  Outcome: Progressing  Goal: Absence of Hospital-Acquired Illness or Injury  Outcome: Progressing  Goal: Optimal Comfort and Wellbeing  Outcome: Progressing  Goal: Readiness for Transition of Care  Outcome: Progressing     Problem: Diabetes Comorbidity  Goal: Blood Glucose Level Within Targeted Range  Outcome: Progressing     Problem: Skin Injury Risk Increased  Goal: Skin Health and Integrity  Outcome: Progressing     Problem: Gas Exchange Impaired  Goal: Optimal Gas Exchange  Outcome: Progressing     Problem: Hemodialysis  Goal: Safe, Effective Therapy Delivery  Outcome: Progressing  Goal: Effective Tissue Perfusion  Outcome: Progressing  Goal: Absence of Infection Signs and Symptoms  Outcome: Progressing     Problem: Fall Injury Risk  Goal: Absence of Fall and Fall-Related Injury  Outcome: Progressing     Problem: Restraint, Nonviolent  Goal: Absence of Harm or Injury  Outcome: Progressing

## 2024-11-17 NOTE — ASSESSMENT & PLAN NOTE
Hx of serial paracentesis for abdominal ascites x2 (last 10/2024). Course with severe sepsis with AMS. Pulmonary source high in differential. Infectious w/u with BCx x2 and planned pleural fluid Cxs. Will start Zosyn and de-escalate to off for unremarkable pleural fluid studies.

## 2024-11-17 NOTE — ASSESSMENT & PLAN NOTE
Temp 100.4, leukocytosis of 15K noted on 11/17 in the setting of worsening encephalopathy.  Check UA- mild urine output reported(ESRD).   Chest imaging with effusion, likely from renal disease but will check Lyte's criteria/culture once thora is done.  Blood cultures x 2.  Lactate WNL.   Hold on antibiotics for now but low threshold to start and expand infectious workup pending clinical course.

## 2024-11-17 NOTE — ASSESSMENT & PLAN NOTE
Hyponatremia is likely due to renal insufficiency. The patient's most recent sodium results are listed below.  Recent Labs     11/15/24  1155 11/16/24  0359 11/17/24  0331   * 126* 125*       Plan  - Correct the sodium by 4-6mEq in 24 hours.   - Obtain the following studies: none.  - Will treat the hyponatremia with Hemodialysis  - Monitor sodium Daily.   - Patient hyponatremia is worsening, clinical exam consistent with dehydration however significant pleural effusion noted so avoiding IV fluids.

## 2024-11-18 PROBLEM — I25.119 CORONARY ARTERY DISEASE WITH ANGINA PECTORIS: Status: ACTIVE | Noted: 2024-01-01

## 2024-11-18 NOTE — ASSESSMENT & PLAN NOTE
- blood cultures positive for staph aureus  - being followed by critical care team   4 = No assist / stand by assistance

## 2024-11-18 NOTE — PLAN OF CARE
1030  Attempt to see pt however he does not answer questions and grunts when asked to call family. Wife is not at bedside however should return shortly per RN.   1500  Pt has had multiple codes per ICS, MD is having family discussion now. Following needs as arise.

## 2024-11-18 NOTE — PLAN OF CARE
Problem: Adult Inpatient Plan of Care  Goal: Plan of Care Review  Outcome: Progressing  Goal: Patient-Specific Goal (Individualized)  Outcome: Progressing  Goal: Absence of Hospital-Acquired Illness or Injury  Outcome: Progressing  Goal: Optimal Comfort and Wellbeing  Outcome: Progressing  Goal: Readiness for Transition of Care  Outcome: Progressing     Problem: Diabetes Comorbidity  Goal: Blood Glucose Level Within Targeted Range  Outcome: Progressing     Problem: Skin Injury Risk Increased  Goal: Skin Health and Integrity  Outcome: Progressing     Problem: Gas Exchange Impaired  Goal: Optimal Gas Exchange  Outcome: Progressing     Problem: Hemodialysis  Goal: Safe, Effective Therapy Delivery  Outcome: Progressing  Goal: Effective Tissue Perfusion  Outcome: Progressing  Goal: Absence of Infection Signs and Symptoms  Outcome: Progressing     Problem: Fall Injury Risk  Goal: Absence of Fall and Fall-Related Injury  Outcome: Progressing     Problem: Restraint, Nonviolent  Goal: Absence of Harm or Injury  Outcome: Progressing     Problem: Sepsis/Septic Shock  Goal: Optimal Coping  Outcome: Progressing  Goal: Absence of Bleeding  Outcome: Progressing  Goal: Blood Glucose Level Within Targeted Range  Outcome: Progressing  Goal: Absence of Infection Signs and Symptoms  Outcome: Progressing  Goal: Optimal Nutrition Intake  Outcome: Progressing     Problem: Wound  Goal: Optimal Coping  Outcome: Progressing  Goal: Optimal Functional Ability  Outcome: Progressing  Goal: Absence of Infection Signs and Symptoms  Outcome: Progressing  Goal: Improved Oral Intake  Outcome: Progressing  Goal: Optimal Pain Control and Function  Outcome: Progressing  Goal: Skin Health and Integrity  Outcome: Progressing  Goal: Optimal Wound Healing  Outcome: Progressing

## 2024-11-18 NOTE — NURSING
1521: pt asystole on the monitor. Is a DNR. Wife at bedside. Dr lozano here to pronouce.  1525 LINDSAY notified, spoke with stephen. Due to sepsis and (+) blood cultures pt is a rule out. Ref# [id number].  1535: notified  of pt death.

## 2024-11-18 NOTE — DISCHARGE SUMMARY
[facility]  Critical Care Medicine  Discharge Summary      Patient Name: Eleuterio Foreman  MRN: [id number]  Admission Date: 11/15/2024  Hospital Length of Stay: 2 days  Discharge Date and Time:  11/18/2024 5:23 PM  Attending Physician: Inessa Benitez MD   Discharging Provider: Eris Murillo MD  Primary Care Provider: [facility]  Reason for Admission:  Acute respiratory failure sepsis bacterial endocarditis mitral valve vegetation    HPI:   No notes on file    * No surgery found *    Indwelling Lines/Drains at Time of Discharge:   Lines/Drains/Airways       Drain  Duration                  Hemodialysis AV Fistula Left upper arm -- days         Chest Tube 11/17/24 1731 Tube - 1 Right Midaxillary;Pleural 14 Fr. <1 day              Airway  Duration                  Airway - Non-Surgical 11/18/24 1402 Endotracheal Tube <1 day                  Hospital Course:   11/17: transferred to ICU for AMS and sepsis; GPC in BCx x2.       11/18/2024 called around early this afternoon around 1:00 pm. for a cardiopulmonary arrest patient bradycardic required intubation he was a 6 minute code he was able to be resuscitated without difficulty he then proceeded to have ongoing recurrent code he had 5 separate codes with ACLS .protocol we were were able to resuscitate but he was unable to maintain a pulse and blood pressure after long discussion with the wife she wanted to make him a DNR and this is time he was pronounced dead.  His cause of death was sepsis secondary to sepsis with mitral valve vegetation suspect his acute event was an embolic phenomenon either to the neuro system urge of the GI tract.      Multiple cardiopulmonary arrest ACLS was protocol was followed he was about 45 minutes to an hour on the number of codes he was pronounced dead at 1535 family was informed    Consults (From admission, onward)          Status Ordering Provider     Pharmacy to dose Vancomycin consult  Once       "  Provider:  (Not yet assigned)   Placed in "And" Linked Group    Acknowledged INESSA SIDDIQUI     Inpatient consult to Pulmonology  Once        Provider:  Inessa Siddiqui MD    Completed NATY ACEVEDO     Inpatient consult to Cardiology  Once        Provider:  Ish Hall MD    Completed NATY ACEVEDO     Inpatient consult to Nephrology  Once        Provider:  Prosper Aviles MD    Acknowledged NATY ACEVEDO          Significant Labs:  Recent Lab Results  (Last 5 results in the past 24 hours)        11/18/24  1443   11/18/24  1426   11/18/24  1133   11/18/24  1132   11/18/24  0537        Anion Gap   19             Aniso   1+             Ao root annulus       3.11         Ao peak frances       2.2         Ao VTI       42.5         AV valve area       1.8         KALEY by Velocity Ratio       1.8         AORTIC VALVE CUSP SEPERATION       1.98         AV mean gradient       11.4         AV index (prosthetic)       0.40         AV peak gradient       19.4         AV Velocity Ratio       0.41         Bands   20             Baso #   0.10             Basophil %   0.5             BSA       2.02         BUN   69             BUN/CREAT RATIO   11             Calcium   10.4             Chloride   87             CO2   22             Creatinine   6.28             Left Ventricle Relative Wall Thickness       0.59         Differential Method   Manual             eGFR   10             Eos #   0.05             Eos %   0.3             FS       16.2         Glucose   261             Hematocrit   29.8             Hemoglobin   9.8             Hypo               Immature Grans (Abs)   0.95             Immature Granulocytes   5.1             IVC diameter       1.99         IVSd       1.4         LA size       4.4         LVOT area       4.5         LV EDV BP       58.83         LV Diastolic Volume Index       28.84         Left Ventricular End Diastolic Volume by Teichholz Method       58.83         Left Ventricular End Systolic " Volume by Teichholz Method       38.72         LVIDd       3.7         LVIDs       3.1         LV mass       156.7         LV Mass Index       76.8         Left Ventricular Outflow Tract Mean Gradient       1.56         Left Ventricular Outflow Tract Mean Velocity       0.59         LVOT diameter       2.4         LVOT peak jeyson       0.9         LVOT stroke volume       76.4         LVOT peak VTI       16.9         LV ESV BP       38.72         LV Systolic Volume Index       19.0         Lymph #   2.12             Lymph %   11.3                8             Magnesium                MCH   32.3             MCHC   32.9             MCV   98.3             Metamyelocytes   2             Mono #   1.43             Mono %   7.6                2             MPV   13.2             Neutrophils, Abs   14.12             Neutrophils Relative   75.2             nRBC   0.1             NT-proBNP   130,099             NUCLEATED RBC ABSOLUTE   0.02             Ovalocytes               PLATELET MORPHOLOGY   Few Large Platelets             Platelet Count   66             POC Glucose 238     281     243       Poly               Potassium   4.5             PW       1.1         RA Vol       130.41         RA Area       32.5         RA area length vol       125.10         RBC   3.03             RDW   14.8             RV/LV Ratio       1.27         RVDD       4.87         RV- hickey basal diam       4.7         RV-hickey length       10.4         RV Basal Diameter       10.43         Segmented Neutrophils, Man %   68             Sodium   123             Triscuspid Valve Regurgitation Peak Gradient       49         TR Max Jeyson       3.49         Troponin I High Sensitivity   86.8  Comment: Critical Result called and verified by verbal readback to: Chintan ADAMS RN on 11/18/2024 at 16:13. Reported by [id number].             WBC   18.77             ZLVIDD       -4.97         ZLVIDS       -1.46                                Significant Imaging:  I have  reviewed all pertinent imaging results/findings within the past 24 hours.    Pending Diagnostic Studies:       Procedure Component Value Units Date/Time    EKG 12-lead [[id number]]     Order Status: Sent Lab Status: No result     EKG 12-lead [[id number]]     Order Status: Sent Lab Status: No result     EKG 12-lead [[id number]]     Order Status: Sent Lab Status: No result     Urinalysis, Reflex to Urine Culture [[id number]]     Order Status: Sent Lab Status: No result     Specimen: Urine           Final Active Diagnoses:    Diagnosis Date Noted POA    PRINCIPAL PROBLEM:  Acute respiratory failure with hypoxia [J96.01] 2024 Yes    Acute metabolic encephalopathy [G93.41] 2024 No    Severe sepsis [A41.9, R65.20] 2024 No    Pleural effusion on right [J90] 2024 Yes    Volume overload [E87.70] 11/15/2024 Yes    Type 2 MI (myocardial infarction) [I21.A1] 11/15/2024 Yes    Hyponatremia [E87.1] 11/15/2024 Yes    Anemia of renal disease [N18.9, D63.1] 11/15/2024 Yes    Hypotension due to drugs [I95.2] 11/15/2024 Yes    Precordial pain [R07.2] 11/15/2024 Yes    Obesity (BMI 30-39.9) [E66.9] 2024 Yes    Coronary artery disease with angina pectoris [I25.119] 2024 Yes    Asthma [J45.909] 2024 Yes    ESRD (end stage renal disease) [N18.6] 2024 Yes      Problems Resolved During this Admission:     No new Assessment & Plan notes have been filed under this hospital service since the last note was generated.  Service: Critical Care Medicine    Discharged Condition:     Disposition:       Patient Instructions:      ACCEPT - Ambulatory referral/consult to Cardiology   Standing Status: Future   Referral Priority: Routine Referral Type: Consultation   Referral Reason: Specialty Services Required   Requested Specialty: Cardiology   Number of Visits Requested: 1     Medications:  None     Eris Murillo MD  Critical Care Medicine  [facility]

## 2024-11-18 NOTE — CONSULTS
[facility]  Cardiology  Consult Note    Patient Name: Eleuterio Foreman  MRN: [id number]  Admission Date: 11/15/2024  Hospital Length of Stay: 2 days  Code Status: Full Code   Attending Provider: Inessa Benitez MD   Consulting Provider: LUCIANA Hyman  Primary Care Physician: [facility]  Principal Problem:Acute respiratory failure with hypoxia    Patient information was obtained from patient, past medical records, ER records, and primary team.     Inpatient consult to Cardiology  Consult performed by: Cynthia Friedman FNP  Consult ordered by: Zeferino Garcia MD  Reason for consult: Recurrent chest pain, known CAD with CABG/stents, unable to maximize anti-anginal therapy due to BP.        Subjective:     Chief Complaint:  chest pain and sob     HPI:   55 y/o male with PMH of CAD s/p CABG (2018) & prox RCA stent 2019, PFO closure 6/2024, PVD s/p right BKA, DM type II, ESRD on dialysis, and HTN who presented to ED with chief complaint of chest pain and shortness of breath that started at 7AM. Patient stated that he woke up around 5AM feeling alright/at baseline. He was supposed to go to dialysis at 5AM but he got confused if it was his day today or not. Around 7AM he noted sharp central chest pain, non-radiating and associated with shortness of breath. No nausea, vomiting, cough, fever reported. He was out of regular S/L NTG, he denies any alleviating or aggravating factors. He has known CAD and reports this pain being similar to one that he had when he had his heart attack. He does have chronic angina as well. No leg swelling reported.      Overview/Hospital Course:  11/16- Undergoing dialysis today, Reports chest pain has improved. Restart Imdur and Coreg as BP acceptable to help with anginal pain. CTA chest negative for PE.   11/17- CXR shows worsening pleural effusion, Pulm consulted for thoracentesis given worsening hypoxia. Cardiology consulted for ongoing anginal symptoms and  medications adjusted. Confused and requiring restraints now. Patient transferred to ICU due to AMS and sepsis, blood cultures positive for staph aureus.    11/18/2024:  Cardiology consulted for recurrent chest pain, known CAD with CABG/stents, unable to maximize anti-anginal therapy due to BP. Patient seen this morning, unable to obtain a history due to AMS, only grunts when asked if he is having any chest pain and appears lethargic. Requiring pressor support, on Levo. No family at bedside.     Troponin 158, 142, 133. Recent LHC 8/1/2024 with patent RCA stent and widely patent LIMA to LAD & SVG to OM1. Repeat echo pending.    Past Medical History:   Diagnosis Date    Diabetes mellitus     ESRD on hemodialysis     Hypertension        Past Surgical History:   Procedure Laterality Date    ANGIOGRAM, CORONARY, WITH LEFT HEART CATHETERIZATION N/A 8/1/2024    Procedure: Angiogram, Coronary, with Left Heart Cath;  Surgeon: Jose Elias Tineo MD;  Location: [facility] CATH LAB;  Service: Cardiology;  Laterality: N/A;       Review of patient's allergies indicates:  No Known Allergies    No current facility-administered medications on file prior to encounter.     Current Outpatient Medications on File Prior to Encounter   Medication Sig    amLODIPine (NORVASC) 2.5 MG tablet Take 2.5 mg by mouth once daily.    aspirin (ECOTRIN) 81 MG EC tablet Take 1 tablet by mouth every morning.    calcium acetate,phosphat bind, (PHOSLO) 667 mg capsule Take 1,334 mg by mouth 3 (three) times daily.    carvediloL (COREG) 12.5 MG tablet Take 12.5 mg by mouth 2 (two) times daily.    clopidogreL (PLAVIX) 75 mg tablet Take 1 tablet by mouth once daily.    isosorbide mononitrate (IMDUR) 60 MG 24 hr tablet Take 1 tablet by mouth every morning.    latanoprost 0.005 % ophthalmic solution Place 1 drop into both eyes every evening.    lisinopriL 10 MG tablet Take 10 mg by mouth 2 (two) times daily.    simvastatin (ZOCOR) 40 MG tablet Take 40 mg by mouth  every evening.     Family History    None       Tobacco Use    Smoking status: Never    Smokeless tobacco: Never   Substance and Sexual Activity    Alcohol use: Not Currently    Drug use: Never    Sexual activity: Not Currently     Review of Systems   Reason unable to perform ROS: unable to obtain, AMS.     Objective:     Vital Signs (Most Recent):  Temp: 99.8 °F (37.7 °C) (11/18/24 1115)  Pulse: 69 (11/18/24 1500)  Resp: (!) 68 (11/18/24 1415)  BP: (!) 82/46 (11/18/24 1500)  SpO2: (!) 93 % (11/18/24 1500) Vital Signs (24h Range):  Temp:  [98.6 °F (37 °C)-99.8 °F (37.7 °C)] 99.8 °F (37.7 °C)  Pulse:  [] 69  Resp:  [11-68] 68  SpO2:  [77 %-100 %] 93 %  BP: ()/() 82/46     Weight: 87.7 kg (193 lb 5.5 oz)  Body mass index is 28.55 kg/m².    SpO2: (!) 93 %         Intake/Output Summary (Last 24 hours) at 11/18/2024 1602  Last data filed at 11/18/2024 0623  Gross per 24 hour   Intake 1210.1 ml   Output 2140 ml   Net -929.9 ml       Lines/Drains/Airways       Drain  Duration                  Chest Tube 11/17/24 1731 Tube - 1 Right Midaxillary;Pleural 14 Fr. <1 day              Airway  Duration                  Airway - Non-Surgical 11/18/24 1402 Endotracheal Tube <1 day              Peripheral Intravenous Line  Duration                  Hemodialysis AV Fistula Left upper arm -- days         Peripheral IV - Single Lumen 11/15/24 1154 20 G Anterior;Right Forearm 3 days         Peripheral IV - Single Lumen 11/17/24 1846 20 G Other (Comments) No Anterior;Proximal;Right Forearm <1 day                     Physical Exam  Vitals reviewed.   Constitutional:       Appearance: He is ill-appearing.   HENT:      Mouth/Throat:      Mouth: Mucous membranes are dry.      Pharynx: Oropharynx is clear.   Cardiovascular:      Rate and Rhythm: Normal rate and regular rhythm.      Heart sounds: Murmur heard.      Systolic murmur is present with a grade of 2/6.   Pulmonary:      Effort: Pulmonary effort is normal.      Breath  "sounds: Rales present.   Abdominal:      Tenderness: There is abdominal tenderness.   Musculoskeletal:      Left lower leg: No edema.      Right Lower Extremity: Right leg is amputated below knee.   Skin:     General: Skin is cool and dry.   Neurological:      Mental Status: He is lethargic.          Significant Labs: ABG:   Recent Labs   Lab 11/17/24  0529   PH 7.47*   PCO2 35   HCO3 25.5   POCSATURATED 93*   , Blood Culture: No results for input(s): "LABBLOO" in the last 48 hours., BMP:   Recent Labs   Lab 11/17/24  0331 11/18/24  0350   * 219*   * 122*   K 4.4 4.5   CL 89* 87*   CO2 25 22   BUN 49* 63*   CREATININE 5.09* 5.84*   CALCIUM 7.8* 8.0*   MG 2.2 2.2   , CMP   Recent Labs   Lab 11/17/24  0331 11/17/24  1210 11/18/24  0350   *  --  122*   K 4.4  --  4.5   CL 89*  --  87*   CO2 25  --  22   *  --  219*   BUN 49*  --  63*   CREATININE 5.09*  --  5.84*   CALCIUM 7.8*  --  8.0*   PROT  --  6.2*  --    ALBUMIN  --  2.4*  --    BILITOT  --  1.2  --    ALKPHOS  --  139  --    AST  --  41*  --    ALT  --  22  --    ANIONGAP 15  --  18*   , CBC   Recent Labs   Lab 11/17/24 0331 11/18/24  0350 11/18/24  1426   WBC 15.78* 16.27* 18.77*   HGB 9.5* 9.2* 9.8*   HCT 28.7* 27.8* 29.8*   * 75* 66*   , Lipid Panel No results for input(s): "CHOL", "HDL", "LDLCALC", "TRIG", "CHOLHDL" in the last 48 hours., and Troponin No results for input(s): "TROPONINI" in the last 48 hours.    Significant Imaging: Cardiac Cath: Results for orders placed during the hospital encounter of 07/30/24    Cardiac catheterization    Conclusion    The Prox Cx to Mid Cx lesion was 100% stenosed.    The Prox LAD lesion was 50% stenosed.    The Mid LAD lesion was 75% stenosed.    The ejection fraction was calculated to be 40%.    There was mild left ventricular systolic dysfunction.    The left ventricular end diastolic pressure was severely elevated.    The pre-procedure left ventricular end diastolic pressure was " 38.    The estimated blood loss was none.    There was three vessel coronary artery disease.    There was diastolic dysfunction.    There was no mitral valve regurgitation.    The procedure log was documented by Documenter: Marsha Barrow RN and verified by Jose Elias Tineo MD.    Date: 8/1/2024  Time: 9:56 AM     , EKG:   Results for orders placed or performed during the hospital encounter of 07/30/24   EKG 12-lead    Collection Time: 08/01/24  1:56 AM   Result Value Ref Range    QRS Duration 114 ms    [facility] QTC Calculation 494 ms    Narrative    Test Reason : R07.9,    Vent. Rate : 060 BPM     Atrial Rate : 060 BPM     P-R Int : 174 ms          QRS Dur : 114 ms      QT Int : 494 ms       P-R-T Axes : 025 042 -19 degrees     QTc Int : 494 ms    Normal sinus rhythm  Abnormal QRS-T angle, consider primary T wave abnormality  Prolonged QT  Abnormal ECG  No previous ECGs available  Confirmed by Billie MCMAHON, Taco FIELDS ([id number]) on 8/6/2024 6:23:17 AM    Referred By: SASHA SALDANA           Confirmed By:Taco Wise MD    , and X-Ray: CXR: X-Ray Chest 1 View (CXR):   Results for orders placed or performed during the hospital encounter of 11/15/24   X-Ray Chest 1 View    Narrative    EXAMINATION:  XR CHEST 1 VIEW    CLINICAL HISTORY:  et tube;    TECHNIQUE:  Single frontal view of the chest was performed.    COMPARISON:  Chest radiograph performed 11/18/2024, 06:50 hours.    FINDINGS:  Tip of endotracheal tube projects approximately 30 mm above the level the [place].  Additional support apparatus grossly unchanged compared to earlier same day examination performed 11/18/2020, 06:50 hours    Similar cardiomediastinal contours    Patchy opacities in both lungs persist, with some degree of improved aeration at the right lung base suggested.    Similar right lateral pleural effusions.    No definite pneumothorax.    Remainder examination not substantially changed.      Impression    As above.      Electronically signed by: Khalif  Radha  Date:    11/18/2024  Time:    14:32     Assessment and Plan:     Severe sepsis  - blood cultures positive for staph aureus  - being followed by critical care team    Acute metabolic encephalopathy  - being followed by critical care team    Pleural effusion on right  - s/p thoracentesis, being followed by critical care team    Coronary artery disease with angina pectoris  - patient seen and evaluated by Dr. Matt  - history of CAD s/p CABG (2018) & prox RCA stent 2019, PFO closure 6/2024; [facility]  - unable to assess chest pain due to AMS, still requiring pressor support, unable to tolerate BB, Imdur; currently on Ranexa 500mg BID  - troponin slightly elevated 100s x3, trending down to 60s  - recent [facility] 08/01/2024 with patent RCA stent and widely patent LIMA to LAD and SVG to OM  - CTA negative for PE with large right pleural effusion with near complete compressive atelectasis of the right lower lobe   - repeat echo pending  - likely type 2 MI secondary to acute respiratory failure and severe sepsis  - further recommendations to follow once echo results reviewed    ESRD (end stage renal disease)  - on dialysis, followed by Nephrology        VTE Risk Mitigation (From admission, onward)           Ordered     heparin (porcine) injection 5,000 Units  Every 8 hours         11/15/24 1510     IP VTE HIGH RISK PATIENT  Once         11/15/24 1510     Place sequential compression device  Until discontinued         11/15/24 1510                    Thank you for your consult. I will follow-up with patient. Please contact us if you have any additional questions.    Cynthia Friedman, ALEAP  Cardiology   [facility]    PT seen and examined, chart reviewed, essentially agree with findings as above.     CC; chest pain, shortness of breath  HPI: Pt not a reliable historian, hx obtained from old records.   PMH: reviewed  PE: agree with above  Labs, Xrays, EKGs,  reviewed, echo  pending.    IMP/Plan:    Elevated troponins, likely due to type 2 MI, demand perfusion mismatch, likely ongoing septic shock.  Hx chest pain, atypical; await results of echo  Sepsis: blood cultures positive, appreciate efforts of ICU team  CAD: appears stable, further recs pending results of echo  PFO: recent PFO closure in [place]    Will follow with you

## 2024-11-18 NOTE — PROGRESS NOTES
[facility]  Critical Care Medicine  Progress Note    Patient Name: Eleuterio Foreman  MRN: [id number]  Admission Date: 11/15/2024  Hospital Length of Stay: 2 days  Code Status: Full Code  Attending Provider: Inessa Benitez MD  Primary Care Provider: [facility]   Principal Problem: Acute respiratory failure with hypoxia    Subjective:     HPI:  No notes on file    Hospital/ICU Course:  11/17: transferred to ICU for AMS and sepsis; GPC in BCx x2.           Interval History/Significant Events:  Patient without complaints    Review of Systems  Objective:     Vital Signs (Most Recent):  Temp: 99.2 °F (37.3 °C) (11/18/24 0315)  Pulse: 75 (11/18/24 0415)  Resp: 17 (11/18/24 0415)  BP: (!) 95/43 (11/18/24 0400)  SpO2: 100 % (11/18/24 0623) Vital Signs (24h Range):  Temp:  [98.3 °F (36.8 °C)-99.6 °F (37.6 °C)] 99.2 °F (37.3 °C)  Pulse:  [65-82] 75  Resp:  [11-25] 17  SpO2:  [77 %-100 %] 100 %  BP: ()/() 95/43   Weight: 87.7 kg (193 lb 5.5 oz)  Body mass index is 28.55 kg/m².      Intake/Output Summary (Last 24 hours) at 11/18/2024 0637  Last data filed at 11/18/2024 0623  Gross per 24 hour   Intake 1210.1 ml   Output 2140 ml   Net -929.9 ml          Physical Exam  Vitals reviewed.   Constitutional:       Appearance: Normal appearance.      Interventions: He is not intubated.  HENT:      Head: Normocephalic and atraumatic.      Nose: Nose normal.      Mouth/Throat:      Mouth: Mucous membranes are dry.      Pharynx: Oropharynx is clear.   Eyes:      Extraocular Movements: Extraocular movements intact.      Conjunctiva/sclera: Conjunctivae normal.      Pupils: Pupils are equal, round, and reactive to light.   Cardiovascular:      Rate and Rhythm: Normal rate.      Heart sounds: Normal heart sounds. No murmur heard.  Pulmonary:      Effort: Pulmonary effort is normal. He is not intubated.      Breath sounds: Normal breath sounds.   Abdominal:      General: Abdomen is flat. Bowel sounds are  normal.      Palpations: Abdomen is soft.   Musculoskeletal:         General: Normal range of motion.      Cervical back: Normal range of motion and neck supple.      Right lower leg: No edema.      Left lower leg: No edema.   Skin:     General: Skin is warm and dry.      Capillary Refill: Capillary refill takes less than 2 seconds.   Neurological:      General: No focal deficit present.      Mental Status: He is alert and oriented to person, place, and time.   Psychiatric:         Mood and Affect: Mood normal.         Behavior: Behavior normal.            Vents:  Oxygen Concentration (%): 79 (11/18/24 0345)  Lines/Drains/Airways       Drain  Duration                  Chest Tube 11/17/24 1731 Tube - 1 Right Midaxillary;Pleural 14 Fr. <1 day              Peripheral Intravenous Line  Duration                  Hemodialysis AV Fistula Left upper arm -- days         Peripheral IV - Single Lumen 11/15/24 1154 20 G Anterior;Right Forearm 2 days         Peripheral IV - Single Lumen 11/17/24 1846 20 G Other (Comments) No Anterior;Proximal;Right Forearm <1 day                  Significant Labs:    CBC/Anemia Profile:  Recent Labs   Lab 11/17/24 0331 11/17/24  1210 11/18/24  0350   WBC 15.78*  --  16.27*   HGB 9.5*  --  9.2*   HCT 28.7*  --  27.8*   *  --  75*   MCV 99.3*  --  98.6*   RDW 14.5  --  14.6*   FOLATE  --  5.8*  --    [id number]  --  >2,000*  --         Chemistries:  Recent Labs   Lab 11/17/24 0331 11/17/24  1210 11/18/24  0350   *  --  122*   K 4.4  --  4.5   CL 89*  --  87*   CO2 25  --  22   BUN 49*  --  63*   CREATININE 5.09*  --  5.84*   CALCIUM 7.8*  --  8.0*   ALBUMIN  --  2.4*  --    PROT  --  6.2*  --    BILITOT  --  1.2  --    ALKPHOS  --  139  --    ALT  --  22  --    AST  --  41*  --    MG 2.2  --  2.2       Recent Lab Results  (Last 5 results in the past 24 hours)        11/18/24  0537   11/18/24  0350   11/17/24 2009 11/17/24  1756   11/17/24  1737        Lymphs, Fluid       9          Anion Gap   18             Bands   4             Baso #   0.12             Basophil %   0.7             Body Fluid, Albumin       1.8  Comment: RIGHT PLEURAL EFFUSION  Reference ranges for this analyte have not been established for the body fluid specimen submitted for analysis.         Body Fluid, Protein       3.9  Comment: RIGHT PLEURAL EFFUSION  Reference ranges for this analyte have not been established for the body fluid specimen submitted for analysis.         BUN   63             BUN/CREAT RATIO   11             Calcium   8.0             Cell Count Excluding RBCs       19,200         Chloride   87             Cholesterol, Body Fluid       36  Comment: RIGHT PLEURAL EFFUSION  Reference ranges for this analyte have not been established for the body fluid specimen submitted for analysis.         Clarity, Body Fluid       Bloody         CO2   22             Color, Body Fluid       Red         Cortisol               Creatinine   5.84             Differential Method   Manual             eGFR   11             Eos #   0.01             Eos %   0.1             Glucose   219             Glucose Body Fluid       121  Comment: RIGHT PLEURAL EFFUSION  Reference ranges for this analyte have not been established for the body fluid specimen submitted for analysis.         Hematocrit   27.8             Hemoglobin   9.2             Hypo   Few             Immature Grans (Abs)   0.71             Immature Granulocytes   4.4             LD, Fluid       667  Comment: RIGHT PLEURAL EFFUSION  Reference ranges for this analyte have not been established for the body fluid specimen submitted for analysis.         Lymph #   0.72             Lymph %   4.4                5             Magnesium    2.2             MCH   32.6             MCHC   33.1             MCV   98.6             Mono #   1.42             Mono %   8.7                5             Monocytes, Fluid Man %       18         MPV   12.0             Neutrophils, Abs   13.29              Neutrophils Relative   81.7             nRBC   0.0             NUCLEATED RBC ABSOLUTE   0.00             Ovalocytes   Few             PLATELET MORPHOLOGY   Decreased             Platelet Count   75             POC Glucose 243     250     194       Poly   Few             Polys, Fluid Man %       73         Potassium   4.5             RBC   2.82             RBC, Body Fluid       70,000         RDW   14.6             Segmented Neutrophils, Man %   86             Sodium   122             Triglycerides, BF       23  Comment: RIGHT PLEURAL EFFUSION  Reference ranges for this analyte have not been established for the body fluid specimen submitted for analysis.         WBC   16.27                                    Significant Imaging:  I have reviewed all pertinent imaging results/findings within the past 24 hours.    ABG  Recent Labs   Lab 11/17/24  0529   PH 7.47*   PO2 62*   PCO2 35   HCO3 25.5     Assessment/Plan:     Neuro  Acute metabolic encephalopathy  Hx of R MCA and R PCA CVA. Progression in change in mental status, concern for exacerbation post dialysis. Gas w/o hypercapnia and ammonia wnl. Febrile with concern for septic encephalopathy. Ddx includes disequilibrium syndrome vs symptomatic hyponatremia with Na lowest to date.   - obtain CT Head STAT  - will consider MRI  - plan on hypertonic saline bolus x1 upon transfer to ICU  - infectious w/u    Pulmonary  * Acute respiratory failure with hypoxia  Patient has acute respiratory failure wean high-flow today    Pleural effusion on right  Still draining a lot of fluid 460 cc last 12 hours decreasing will repeat chest x-ray    Cardiac/Vascular  Type 2 MI (myocardial infarction)  NSTEMI 2/2 demand/type 2 ischemia. Tn peaked on first draw. Last CorAngio 08/2024 reviewed.   - obtain Tn x1 and EKG with change in mental status    CAD (coronary artery disease)  S/p CABG.   - transition to Lopressor 25 mg BID  - cont Imdur 60 mg Qday an start Ranexa for chronic  angina   - appreciate Cardiology recs    Renal/  ESRD (end stage renal disease)  ESRD on HD.   - HD per Nephrology; T, Th, Sat    ID  Severe sepsis  Hx of serial paracentesis for abdominal ascites x2 (last 10/2024). Course with severe sepsis with AMS. Pulmonary source high in differential. Infectious w/u with BCx x2 and planned pleural fluid Cxs. Will start Zosyn and de-escalate to off for unremarkable pleural fluid studies.     Oncology  Anemia of renal disease  Noted    Endocrine  Hyponatremia  Hyponatremia driven by end-stage renal disease watch        .     Eris Murillo MD  Critical Care Medicine  [facility]

## 2024-11-18 NOTE — HPI
55 y/o male with PMH of CAD s/p CABG (2018) & prox RCA stent 2019, PFO closure 6/2024, PVD s/p right BKA, DM type II, ESRD on dialysis, and HTN who presented to ED with chief complaint of chest pain and shortness of breath that started at 7AM. Patient stated that he woke up around 5AM feeling alright/at baseline. He was supposed to go to dialysis at 5AM but he got confused if it was his day today or not. Around 7AM he noted sharp central chest pain, non-radiating and associated with shortness of breath. No nausea, vomiting, cough, fever reported. He was out of regular S/L NTG, he denies any alleviating or aggravating factors. He has known CAD and reports this pain being similar to one that he had when he had his heart attack. He does have chronic angina as well. No leg swelling reported.      Overview/Hospital Course:  11/16- Undergoing dialysis today, Reports chest pain has improved. Restart Imdur and Coreg as BP acceptable to help with anginal pain. CTA chest negative for PE.   11/17- CXR shows worsening pleural effusion, Pulm consulted for thoracentesis given worsening hypoxia. Cardiology consulted for ongoing anginal symptoms and medications adjusted. Confused and requiring restraints now. Patient transferred to ICU due to AMS and sepsis, blood cultures positive for staph aureus.    11/18/2024:  Cardiology consulted for recurrent chest pain, known CAD with CABG/stents, unable to maximize anti-anginal therapy due to BP. Patient seen this morning, unable to obtain a history due to AMS, only grunts when asked if he is having any chest pain and appears lethargic. Requiring pressor support, on Levo. No family at bedside.     Troponin 158, 142, 133. Recent LHC 8/1/2024 with patent RCA stent and widely patent LIMA to LAD & SVG to OM1. Repeat echo pending.

## 2024-11-18 NOTE — PROGRESS NOTES
Patient remains poorly interactive, he did grunt and moan to his name being called.     Physical exam: General, the patient is chronically Ill appearing, heart is regular rate and rhythm, he has no pitting edema. Lungs are clear to auscultation, abdomen is soft with positive bowel sounds    Assessment/plan number one. Esrd  will plan on dialysis tomorrow  Two. Sepsis - continue on the anabiotics and pressor support therapy. Pt had two blood cultures positive for gram-positive cocci.  Three. Respiratory failure-continue high  flow oxygen.  4. Anemia - hematocrit is 27%, Ill start EPO.

## 2024-11-18 NOTE — ASSESSMENT & PLAN NOTE
- patient seen and evaluated by Dr. Matt  - history of CAD s/p CABG (2018) & prox RCA stent 2019, PFO closure 6/2024; followed by Dr. Heller at [facility]  - unable to assess chest pain due to AMS, still requiring pressor support, unable to tolerate BB, Imdur; currently on Ranexa 500mg BID  - troponin slightly elevated 100s x3, trending down to 60s  - recent [facility] 08/01/2024 with patent RCA stent and widely patent LIMA to LAD and SVG to OM  - CTA negative for PE with large right pleural effusion with near complete compressive atelectasis of the right lower lobe   - repeat echo pending  - likely type 2 MI secondary to acute respiratory failure and severe sepsis  - further recommendations to follow once echo results reviewed

## 2024-11-18 NOTE — PLAN OF CARE
Problem: Diabetes Comorbidity  Goal: Blood Glucose Level Within Targeted Range  Outcome: Progressing  Intervention: Monitor and Manage Glycemia  Flowsheets (Taken 11/18/2024 0434)  Glycemic Management: blood glucose monitored     Problem: Skin Injury Risk Increased  Goal: Skin Health and Integrity  Outcome: Progressing  Intervention: Optimize Skin Protection  Flowsheets (Taken 11/18/2024 0434)  Pressure Reduction Techniques: sit time limited to 2 hours  Pressure Reduction Devices:   foam padding utilized   positioning supports utilized  Skin Protection:   transparent dressing maintained   silicone foam dressing in place  Activity Management: Arm raise - L1  Head of Bed (HOB) Positioning: HOB at 30-45 degrees     Problem: Gas Exchange Impaired  Goal: Optimal Gas Exchange  Outcome: Progressing  Intervention: Optimize Oxygenation and Ventilation  Flowsheets (Taken 11/18/2024 0434)  Airway/Ventilation Management:   pulmonary hygiene promoted   humidification applied   calming measures promoted   airway patency maintained  Head of Bed (HOB) Positioning: HOB at 30-45 degrees

## 2024-11-18 NOTE — SUBJECTIVE & OBJECTIVE
Past Medical History:   Diagnosis Date    Diabetes mellitus     ESRD on hemodialysis     Hypertension        Past Surgical History:   Procedure Laterality Date    ANGIOGRAM, CORONARY, WITH LEFT HEART CATHETERIZATION N/A 8/1/2024    Procedure: Angiogram, Coronary, with Left Heart Cath;  Surgeon: Jose Elias Tineo MD;  Location: [facility] CATH LAB;  Service: Cardiology;  Laterality: N/A;       Review of patient's allergies indicates:  No Known Allergies    No current facility-administered medications on file prior to encounter.     Current Outpatient Medications on File Prior to Encounter   Medication Sig    amLODIPine (NORVASC) 2.5 MG tablet Take 2.5 mg by mouth once daily.    aspirin (ECOTRIN) 81 MG EC tablet Take 1 tablet by mouth every morning.    calcium acetate,phosphat bind, (PHOSLO) 667 mg capsule Take 1,334 mg by mouth 3 (three) times daily.    carvediloL (COREG) 12.5 MG tablet Take 12.5 mg by mouth 2 (two) times daily.    clopidogreL (PLAVIX) 75 mg tablet Take 1 tablet by mouth once daily.    isosorbide mononitrate (IMDUR) 60 MG 24 hr tablet Take 1 tablet by mouth every morning.    latanoprost 0.005 % ophthalmic solution Place 1 drop into both eyes every evening.    lisinopriL 10 MG tablet Take 10 mg by mouth 2 (two) times daily.    simvastatin (ZOCOR) 40 MG tablet Take 40 mg by mouth every evening.     Family History    None       Tobacco Use    Smoking status: Never    Smokeless tobacco: Never   Substance and Sexual Activity    Alcohol use: Not Currently    Drug use: Never    Sexual activity: Not Currently     Review of Systems   Reason unable to perform ROS: unable to obtain, AMS.     Objective:     Vital Signs (Most Recent):  Temp: 99.8 °F (37.7 °C) (11/18/24 1115)  Pulse: 69 (11/18/24 1500)  Resp: (!) 68 (11/18/24 1415)  BP: (!) 82/46 (11/18/24 1500)  SpO2: (!) 93 % (11/18/24 1500) Vital Signs (24h Range):  Temp:  [98.6 °F (37 °C)-99.8 °F (37.7 °C)] 99.8 °F (37.7 °C)  Pulse:  [] 69  Resp:   "[11-68] 68  SpO2:  [77 %-100 %] 93 %  BP: ()/() 82/46     Weight: 87.7 kg (193 lb 5.5 oz)  Body mass index is 28.55 kg/m².    SpO2: (!) 93 %         Intake/Output Summary (Last 24 hours) at 11/18/2024 1602  Last data filed at 11/18/2024 0623  Gross per 24 hour   Intake 1210.1 ml   Output 2140 ml   Net -929.9 ml       Lines/Drains/Airways       Drain  Duration                  Chest Tube 11/17/24 1731 Tube - 1 Right Midaxillary;Pleural 14 Fr. <1 day              Airway  Duration                  Airway - Non-Surgical 11/18/24 1402 Endotracheal Tube <1 day              Peripheral Intravenous Line  Duration                  Hemodialysis AV Fistula Left upper arm -- days         Peripheral IV - Single Lumen 11/15/24 1154 20 G Anterior;Right Forearm 3 days         Peripheral IV - Single Lumen 11/17/24 1846 20 G Other (Comments) No Anterior;Proximal;Right Forearm <1 day                     Physical Exam  Vitals reviewed.   Constitutional:       Appearance: He is ill-appearing.   HENT:      Mouth/Throat:      Mouth: Mucous membranes are dry.      Pharynx: Oropharynx is clear.   Cardiovascular:      Rate and Rhythm: Normal rate and regular rhythm.      Heart sounds: Murmur heard.      Systolic murmur is present with a grade of 2/6.   Pulmonary:      Effort: Pulmonary effort is normal.      Breath sounds: Rales present.   Abdominal:      Tenderness: There is abdominal tenderness.   Musculoskeletal:      Left lower leg: No edema.      Right Lower Extremity: Right leg is amputated below knee.   Skin:     General: Skin is cool and dry.   Neurological:      Mental Status: He is lethargic.          Significant Labs: ABG:   Recent Labs   Lab 11/17/24  0529   PH 7.47*   PCO2 35   HCO3 25.5   POCSATURATED 93*   , Blood Culture: No results for input(s): "LABBLOO" in the last 48 hours., BMP:   Recent Labs   Lab 11/17/24  0331 11/18/24  0350   * 219*   * 122*   K 4.4 4.5   CL 89* 87*   CO2 25 22   BUN 49* 63* " "  CREATININE 5.09* 5.84*   CALCIUM 7.8* 8.0*   MG 2.2 2.2   , CMP   Recent Labs   Lab 11/17/24  0331 11/17/24  1210 11/18/24  0350   *  --  122*   K 4.4  --  4.5   CL 89*  --  87*   CO2 25  --  22   *  --  219*   BUN 49*  --  63*   CREATININE 5.09*  --  5.84*   CALCIUM 7.8*  --  8.0*   PROT  --  6.2*  --    ALBUMIN  --  2.4*  --    BILITOT  --  1.2  --    ALKPHOS  --  139  --    AST  --  41*  --    ALT  --  22  --    ANIONGAP 15  --  18*   , CBC   Recent Labs   Lab 11/17/24  0331 11/18/24  0350 11/18/24  1426   WBC 15.78* 16.27* 18.77*   HGB 9.5* 9.2* 9.8*   HCT 28.7* 27.8* 29.8*   * 75* 66*   , Lipid Panel No results for input(s): "CHOL", "HDL", "LDLCALC", "TRIG", "CHOLHDL" in the last 48 hours., and Troponin No results for input(s): "TROPONINI" in the last 48 hours.    Significant Imaging: Cardiac Cath: Results for orders placed during the hospital encounter of 07/30/24    Cardiac catheterization    Conclusion    The Prox Cx to Mid Cx lesion was 100% stenosed.    The Prox LAD lesion was 50% stenosed.    The Mid LAD lesion was 75% stenosed.    The ejection fraction was calculated to be 40%.    There was mild left ventricular systolic dysfunction.    The left ventricular end diastolic pressure was severely elevated.    The pre-procedure left ventricular end diastolic pressure was 38.    The estimated blood loss was none.    There was three vessel coronary artery disease.    There was diastolic dysfunction.    There was no mitral valve regurgitation.    The procedure log was documented by Documenter: Marsha Barrow RN and verified by Jose Elias Tineo MD.    Date: 8/1/2024  Time: 9:56 AM     , EKG:   Results for orders placed or performed during the hospital encounter of 07/30/24   EKG 12-lead    Collection Time: 08/01/24  1:56 AM   Result Value Ref Range    QRS Duration 114 ms    [facility] QTC Calculation 494 ms    Narrative    Test Reason : R07.9,    Vent. Rate : 060 BPM     Atrial Rate : 060 BPM    "  P-R Int : 174 ms          QRS Dur : 114 ms      QT Int : 494 ms       P-R-T Axes : 025 042 -19 degrees     QTc Int : 494 ms    Normal sinus rhythm  Abnormal QRS-T angle, consider primary T wave abnormality  Prolonged QT  Abnormal ECG  No previous ECGs available  Confirmed by Billie MCMAHON, Taco FIELDS ([id number]) on 8/6/2024 6:23:17 AM    Referred By: SASHA SALDANA           Confirmed By:Taco Wise MD    , and X-Ray: CXR: X-Ray Chest 1 View (CXR):   Results for orders placed or performed during the hospital encounter of 11/15/24   X-Ray Chest 1 View    Narrative    EXAMINATION:  XR CHEST 1 VIEW    CLINICAL HISTORY:  et tube;    TECHNIQUE:  Single frontal view of the chest was performed.    COMPARISON:  Chest radiograph performed 11/18/2024, 06:50 hours.    FINDINGS:  Tip of endotracheal tube projects approximately 30 mm above the level the [place].  Additional support apparatus grossly unchanged compared to earlier same day examination performed 11/18/2020, 06:50 hours    Similar cardiomediastinal contours    Patchy opacities in both lungs persist, with some degree of improved aeration at the right lung base suggested.    Similar right lateral pleural effusions.    No definite pneumothorax.    Remainder examination not substantially changed.      Impression    As above.      Electronically signed by: Khalif Garcia  Date:    11/18/2024  Time:    14:32

## 2024-11-18 NOTE — SUBJECTIVE & OBJECTIVE
Interval History/Significant Events:  Patient without complaints    Review of Systems  Objective:     Vital Signs (Most Recent):  Temp: 99.2 °F (37.3 °C) (11/18/24 0315)  Pulse: 75 (11/18/24 0415)  Resp: 17 (11/18/24 0415)  BP: (!) 95/43 (11/18/24 0400)  SpO2: 100 % (11/18/24 0623) Vital Signs (24h Range):  Temp:  [98.3 °F (36.8 °C)-99.6 °F (37.6 °C)] 99.2 °F (37.3 °C)  Pulse:  [65-82] 75  Resp:  [11-25] 17  SpO2:  [77 %-100 %] 100 %  BP: ()/() 95/43   Weight: 87.7 kg (193 lb 5.5 oz)  Body mass index is 28.55 kg/m².      Intake/Output Summary (Last 24 hours) at 11/18/2024 0637  Last data filed at 11/18/2024 0623  Gross per 24 hour   Intake 1210.1 ml   Output 2140 ml   Net -929.9 ml          Physical Exam  Vitals reviewed.   Constitutional:       Appearance: Normal appearance.      Interventions: He is not intubated.  HENT:      Head: Normocephalic and atraumatic.      Nose: Nose normal.      Mouth/Throat:      Mouth: Mucous membranes are dry.      Pharynx: Oropharynx is clear.   Eyes:      Extraocular Movements: Extraocular movements intact.      Conjunctiva/sclera: Conjunctivae normal.      Pupils: Pupils are equal, round, and reactive to light.   Cardiovascular:      Rate and Rhythm: Normal rate.      Heart sounds: Normal heart sounds. No murmur heard.  Pulmonary:      Effort: Pulmonary effort is normal. He is not intubated.      Breath sounds: Normal breath sounds.   Abdominal:      General: Abdomen is flat. Bowel sounds are normal.      Palpations: Abdomen is soft.   Musculoskeletal:         General: Normal range of motion.      Cervical back: Normal range of motion and neck supple.      Right lower leg: No edema.      Left lower leg: No edema.   Skin:     General: Skin is warm and dry.      Capillary Refill: Capillary refill takes less than 2 seconds.   Neurological:      General: No focal deficit present.      Mental Status: He is alert and oriented to person, place, and time.   Psychiatric:          Mood and Affect: Mood normal.         Behavior: Behavior normal.            Vents:  Oxygen Concentration (%): 79 (11/18/24 0345)  Lines/Drains/Airways       Drain  Duration                  Chest Tube 11/17/24 1731 Tube - 1 Right Midaxillary;Pleural 14 Fr. <1 day              Peripheral Intravenous Line  Duration                  Hemodialysis AV Fistula Left upper arm -- days         Peripheral IV - Single Lumen 11/15/24 1154 20 G Anterior;Right Forearm 2 days         Peripheral IV - Single Lumen 11/17/24 1846 20 G Other (Comments) No Anterior;Proximal;Right Forearm <1 day                  Significant Labs:    CBC/Anemia Profile:  Recent Labs   Lab 11/17/24  0331 11/17/24  1210 11/18/24  0350   WBC 15.78*  --  16.27*   HGB 9.5*  --  9.2*   HCT 28.7*  --  27.8*   *  --  75*   MCV 99.3*  --  98.6*   RDW 14.5  --  14.6*   FOLATE  --  5.8*  --    [id number]  --  >2,000*  --         Chemistries:  Recent Labs   Lab 11/17/24  0331 11/17/24  1210 11/18/24  0350   *  --  122*   K 4.4  --  4.5   CL 89*  --  87*   CO2 25  --  22   BUN 49*  --  63*   CREATININE 5.09*  --  5.84*   CALCIUM 7.8*  --  8.0*   ALBUMIN  --  2.4*  --    PROT  --  6.2*  --    BILITOT  --  1.2  --    ALKPHOS  --  139  --    ALT  --  22  --    AST  --  41*  --    MG 2.2  --  2.2       Recent Lab Results  (Last 5 results in the past 24 hours)        11/18/24  0537   11/18/24  0350   11/17/24 2009 11/17/24  1756 11/17/24  1737        Lymphs, Fluid       9         Anion Gap   18             Bands   4             Baso #   0.12             Basophil %   0.7             Body Fluid, Albumin       1.8  Comment: RIGHT PLEURAL EFFUSION  Reference ranges for this analyte have not been established for the body fluid specimen submitted for analysis.         Body Fluid, Protein       3.9  Comment: RIGHT PLEURAL EFFUSION  Reference ranges for this analyte have not been established for the body fluid specimen submitted for analysis.         BUN   63              BUN/CREAT RATIO   11             Calcium   8.0             Cell Count Excluding RBCs       19,200         Chloride   87             Cholesterol, Body Fluid       36  Comment: RIGHT PLEURAL EFFUSION  Reference ranges for this analyte have not been established for the body fluid specimen submitted for analysis.         Clarity, Body Fluid       Bloody         CO2   22             Color, Body Fluid       Red         Cortisol               Creatinine   5.84             Differential Method   Manual             eGFR   11             Eos #   0.01             Eos %   0.1             Glucose   219             Glucose Body Fluid       121  Comment: RIGHT PLEURAL EFFUSION  Reference ranges for this analyte have not been established for the body fluid specimen submitted for analysis.         Hematocrit   27.8             Hemoglobin   9.2             Hypo   Few             Immature Grans (Abs)   0.71             Immature Granulocytes   4.4             LD, Fluid       667  Comment: RIGHT PLEURAL EFFUSION  Reference ranges for this analyte have not been established for the body fluid specimen submitted for analysis.         Lymph #   0.72             Lymph %   4.4                5             Magnesium    2.2             MCH   32.6             MCHC   33.1             MCV   98.6             Mono #   1.42             Mono %   8.7                5             Monocytes, Fluid Man %       18         MPV   12.0             Neutrophils, Abs   13.29             Neutrophils Relative   81.7             nRBC   0.0             NUCLEATED RBC ABSOLUTE   0.00             Ovalocytes   Few             PLATELET MORPHOLOGY   Decreased             Platelet Count   75             POC Glucose 243     250     194       Poly   Few             Polys, Fluid Man %       73         Potassium   4.5             RBC   2.82             RBC, Body Fluid       70,000         RDW   14.6             Segmented Neutrophils, Man %   86             Sodium    122             Triglycerides, BF       23  Comment: RIGHT PLEURAL EFFUSION  Reference ranges for this analyte have not been established for the body fluid specimen submitted for analysis.         WBC   16.27                                    Significant Imaging:  I have reviewed all pertinent imaging results/findings within the past 24 hours.

## 2024-11-18 NOTE — HOSPITAL COURSE
11/17: transferred to ICU for AMS and sepsis; GPC in BCx x2.       11/18/2024 called around early this afternoon around 1:00 pm. for a cardiopulmonary arrest patient bradycardic required intubation he was a 6 minute code he was able to be resuscitated without difficulty he then proceeded to have ongoing recurrent code he had 5 separate codes with ACLS .protocol we were were able to resuscitate but he was unable to maintain a pulse and blood pressure after long discussion with the wife she wanted to make him a DNR and this is time he was pronounced dead.  His cause of death was sepsis secondary to sepsis with mitral valve vegetation suspect his acute event was an embolic phenomenon either to the neuro system urge of the GI tract.      Multiple cardiopulmonary arrest ACLS was protocol was followed he was about 45 minutes to an hour on the number of codes he was pronounced dead at 1535 family was informed

## 2024-11-19 LAB
BACTERIA BLD CULT: ABNORMAL
BACTERIA BLD CULT: ABNORMAL
ESTROGEN SERPL-MCNC: NORMAL PG/ML
GRAM STN SPEC: ABNORMAL
INSULIN SERPL-ACNC: NORMAL U[IU]/ML
LAB AP CLINICAL INFORMATION: NORMAL
LAB AP GROSS DESCRIPTION: NORMAL
LAB AP LABORATORY NOTES: NORMAL
LAB AP SPECIMEN A NON-GYN GENERAL CATEGORIZATION: NEGATIVE
LAB AP SPECIMEN A NON-GYN INTERPETATION: NORMAL

## 2024-11-19 NOTE — PLAN OF CARE
[facility] ICU  Discharge Final Note    Primary Care Provider: [facility]key [facility]    Expected Discharge Date: 2024    Final Discharge Note (most recent)       Final Note - 24 0851          Final Note    Assessment Type Final Discharge Note     Anticipated Discharge Disposition         Post-Acute Status    Discharge Delays None known at this time                     Important Message from Medicare                 Pt

## 2024-11-19 NOTE — PROGRESS NOTES
[facility] ICU  Wound Care    Patient Name:  Eleuterio Foreman   MRN:  [id number]  Date: 11/19/2024  Diagnosis: Acute respiratory failure with hypoxia    History:     Past Medical History:   Diagnosis Date    Diabetes mellitus     ESRD on hemodialysis     Hypertension        Social History     Socioeconomic History    Marital status:    Tobacco Use    Smoking status: Never    Smokeless tobacco: Never   Substance and Sexual Activity    Alcohol use: Not Currently    Drug use: Never    Sexual activity: Not Currently     Social Drivers of Health     Financial Resource Strain: Patient Unable To Answer (11/18/2024)    Overall Financial Resource Strain (CARDIA)     Difficulty of Paying Living Expenses: Patient unable to answer   Recent Concern: Financial Resource Strain - Medium Risk (11/15/2024)    Overall Financial Resource Strain (CARDIA)     Difficulty of Paying Living Expenses: Somewhat hard   Food Insecurity: Patient Unable To Answer (11/18/2024)    Hunger Vital Sign     Worried About Running Out of Food in the Last Year: Patient unable to answer     Ran Out of Food in the Last Year: Patient unable to answer   Transportation Needs: Patient Unable To Answer (11/18/2024)    TRANSPORTATION NEEDS     Transportation : Patient unable to answer   Physical Activity: Inactive (7/31/2024)    Exercise Vital Sign     Days of Exercise per Week: 0 days     Minutes of Exercise per Session: 0 min   Stress: Patient Unable To Answer (11/18/2024)    [employer] - Occupational Stress Questionnaire     Feeling of Stress : Patient unable to answer   Recent Concern: Stress - Stress Concern Present (11/15/2024)    [employer] - Occupational Stress Questionnaire     Feeling of Stress : To some extent   Housing Stability: Patient Unable To Answer (11/18/2024)    Housing Stability Vital Sign     Unable to Pay for Housing in the Last Year: Patient unable to answer      Homeless in the Last Year: Patient unable to answer       Precautions:     Allergies as of 11/15/2024    (No Known Allergies)       [facility] Assessment Details/Treatment       Narrative: Seen patient for initiation of preventative skin care measures    Patient in bed, Eyes closed. Has High flow in use. 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 Left heel with out pressure injury. Has Meplex foam border in use. Unable to evaluate sacral.See nursing notes. On low air loss mattress  [place] score 16.     Consult wound care for any skin issues         11/19/2024

## 2024-11-19 NOTE — CODE DOCUMENTATION
1338: bradycardia noted on monitor, Rns at bedside  1339: no pulse noted, CPR started, code blue called, epi given  1341: ACLS ongoing,  at bedside. BMV per RT. No pulse noted at pulse check  1344: PEA at pulse check, epi given  1346: pulse noted at check. Anesthesia now at bedside. Pt moaning, moved all extremities, Decision made to intubate for airway protection  1350: 100mg Roccuronium , 50mg Etomidate given  1352: Intubated with #8 ET, 28@ lips. + color change, bilateral breath sounds noted.   1356: bradycardia noted on monitor, no pulse, CPR started  1357: epi given  1358: pulse noted at pulse check    1413: bradycardia, no pulse at check. CPR started, ACLS ongoing. Epi given  1414: 1 amp bicarb given  1415: palpable pulse noted  1416: 1amp calcium gluconate given.  and Rns remain at bedside.    1449: CPR started for PEA, ACLS in process  1450: epi given  1451: pulse noted at check    1437: CPR started for PEA, ACLS ongoing  1438: epi given  1439: pulse noted. Glucose 238.    1450: wife at bedside. Spoke with , decision made by her to be DNR.   1521: time of death called by , wife at bedside

## 2024-11-20 LAB
BACTERIA SPEC BFLD CULT: ABNORMAL
OHS QRS DURATION: 102 MS
OHS QRS DURATION: 124 MS
OHS QRS DURATION: 140 MS
OHS QTC CALCULATION: 423 MS
OHS QTC CALCULATION: 443 MS
OHS QTC CALCULATION: 481 MS

## (undated) DEVICE — SET EXTENSION CLEARLINK 2INJ

## (undated) DEVICE — Device

## (undated) DEVICE — MASK NRB ADULT STD CONN

## (undated) DEVICE — MASK SIMPLE ADLT ELNG STD CON

## (undated) DEVICE — ETCO2 NC MICROSTR FEM ST ADLT

## (undated) DEVICE — GLOVE SENSICARE PI SLT 6.5

## (undated) DEVICE — GLOVE BIOGEL SKINSENSE PI 7.5

## (undated) DEVICE — OXISENSOR ADULT DIGIT N/S

## (undated) DEVICE — INTRODUCER CATH 6F 11CM

## (undated) DEVICE — SYR MED RAD 150ML

## (undated) DEVICE — SOL IRR SOD CHL .9% POUR

## (undated) DEVICE — DRESSING TRANS 4X4 TEGADERM

## (undated) DEVICE — TUBING HPCIL ROT M/F ADPT 48IN

## (undated) DEVICE — CATH DIAG IMPULSE 6FR FL4

## (undated) DEVICE — CATH DIAG IMPULSE 6FR FR4

## (undated) DEVICE — CONTRAST ISOVUE 370 100ML

## (undated) DEVICE — NDL PERCUTANEOUS ENTRYBSDN 18

## (undated) DEVICE — DEVICE MYNX GRIP 6/7F

## (undated) DEVICE — PROTECTOR ULNAR NERVE FOAM

## (undated) DEVICE — SET IV PRIMARY

## (undated) DEVICE — CHLORAPREP 10.5 ML APPLICATOR

## (undated) DEVICE — DECANTER FLUID TRNSF WHITE 9IN

## (undated) DEVICE — CLIPPER BLADE MOD 4406 (CAREF)